# Patient Record
Sex: MALE | Race: OTHER | NOT HISPANIC OR LATINO
[De-identification: names, ages, dates, MRNs, and addresses within clinical notes are randomized per-mention and may not be internally consistent; named-entity substitution may affect disease eponyms.]

---

## 2021-09-24 PROBLEM — Z00.00 ENCOUNTER FOR PREVENTIVE HEALTH EXAMINATION: Status: ACTIVE | Noted: 2021-09-24

## 2021-09-29 ENCOUNTER — LABORATORY RESULT (OUTPATIENT)
Age: 41
End: 2021-09-29

## 2021-09-29 ENCOUNTER — APPOINTMENT (OUTPATIENT)
Dept: PEDIATRIC ALLERGY IMMUNOLOGY | Facility: CLINIC | Age: 41
End: 2021-09-29
Payer: MEDICAID

## 2021-09-29 ENCOUNTER — OUTPATIENT (OUTPATIENT)
Dept: OUTPATIENT SERVICES | Facility: HOSPITAL | Age: 41
LOS: 1 days | End: 2021-09-29
Payer: COMMERCIAL

## 2021-09-29 VITALS
SYSTOLIC BLOOD PRESSURE: 128 MMHG | RESPIRATION RATE: 22 BRPM | DIASTOLIC BLOOD PRESSURE: 79 MMHG | OXYGEN SATURATION: 98 % | HEART RATE: 61 BPM

## 2021-09-29 VITALS — HEIGHT: 70.87 IN | WEIGHT: 220.46 LBS | BODY MASS INDEX: 30.86 KG/M2

## 2021-09-29 DIAGNOSIS — Z00.00 ENCOUNTER FOR GENERAL ADULT MEDICAL EXAMINATION WITHOUT ABNORMAL FINDINGS: ICD-10-CM

## 2021-09-29 DIAGNOSIS — Z29.9 ENCOUNTER FOR PROPHYLACTIC MEASURES, UNSPECIFIED: ICD-10-CM

## 2021-09-29 PROCEDURE — 99215 OFFICE O/P EST HI 40 MIN: CPT

## 2021-09-29 PROCEDURE — G0463: CPT

## 2021-09-29 PROCEDURE — 36415 COLL VENOUS BLD VENIPUNCTURE: CPT

## 2021-09-29 NOTE — SOCIAL HISTORY
[Sexually Active] : The patient is sexually active [Monogamous] : monogamous [Never] : never [Oral-Receptive (pt. mouth)] : oral-receptive (pt. mouth) [Anal-Receptive (pt anus)] : anal-receptive (pt anus) [To Pt Genitalia] : pt genitalia [To Pt Anus] : pt anus [To Pt Penis] : pt penis [Male ___] : [unfilled] male [Female ___] : [unfilled] female [Date of most recent sexual encounter ___] : ~His/Her~ most recent sexual encounter was [unfilled] [Partnership for Health – Safe Sex Evidence Based Intervention] : The Partnership for Health Safe Sex Evidence Based Intervention was applied [Positive Reinforcement of Safe Behavior Message] : and a positive reinforcement of safe behavior message was provided. [de-identified] : partner and a few roommates in a home. [HIV Risk Factors: Bisexual contact] : less than 3cm

## 2021-09-29 NOTE — DISCUSSION/SUMMARY
[15 min] : 15 min [HIV Education] : HIV Education [Universal Precautions] : universal precautions [Treatment Education] : treatment education [Treatment Adherence] : treatment adherence [Anticipatory Guidance] : anticipatory guidance [Prognosis] : prognosis [Sexuality/Safer Sex] : sexuality/safer sex [Partner Notification Info/Discussion] : partner notification info/discussion [Alarm Reminder] : alarm reminder [Frequent F/U] : frequent f/u [HIV info] : HIV info [Condoms] : condoms [Risk Reduction] : risk reduction [PrEP/PEP] : PrEP/PEP [The Topics Listed Above] : the topics listed above [The patient was able to ask questions and explain these concepts in his/her own words] : the patient was able to ask questions and explain these concepts in his/her own words

## 2021-09-29 NOTE — HISTORY OF PRESENT ILLNESS
[Not Applicable] : Not Applicable [No] : No [FreeTextEntry1] : ADDIS SEALS  is a 40 year  y/o HIV negative male here for a routine PrEP Initial.  \par As per patient he doesn't know much about PrEP but was referred to our program.  Was at an event where he was offered free HIV testing (at [facility]) and then decided to come to us for PrEP and to discuss his other medica concerns.\par States that he has a history of anxiety and depression which is what brought him here.  Was seeing someone back in [place] country ([place] and was given medication (1mg Alprazolam) but does not have that medication here.\par Has been in [place] for 3 months.\par Currently denies SI/HI.  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 They are monogamous \par - In the last 3 months has only had the one partner.  Started using condoms in the beginning but once he found out that his partner was undetectable he hasn’t.  Approx 1 month without condoms.  Met this partner in [place] 2 years ago and began as long distance relationship.\par Patient reports he is currently legally  to a cisfemale but she lives in [place].  Has 1 child that is 7 years old.\par - last sexual encounter was 9/27/21\par \par Tattoos: denies\par IV drug use: denies\par ETOH/Drugs/Smoking: denies smoking or drug use but reports occasional ETOH\par Exchanged sex for money, drugs or housing: in the past yes but not recent\par \par No signs/symptoms of acute HIV. No fever, myalgias, throat pain, meningismus.\par Patient denies any known exposure or signs/symptoms of COVID-19 at this time. \par   [Yes, specify: ______________] : Yes, specify: [unfilled]

## 2021-09-29 NOTE — REASON FOR VISIT
[Initial Consultation] : an initial consultation for [Patient] : patient [[employer] ] : provided by [employer]   [Interpreters_IDNumber] : [id number] [Interpreters_FullName] : Dion [TWNoteComboBox1] : Romanian

## 2021-09-29 NOTE — PHYSICAL EXAM
[Alert] : alert [Well Nourished] : well nourished [Healthy Appearance] : healthy appearance [No Acute Distress] : no acute distress [Well Developed] : well developed [Normal Voice/Communication] : normal voice communication [Normal Pupil & Iris Size/Symmetry] : normal pupil and iris size and symmetry [No Discharge] : no discharge [No Photophobia] : no photophobia [Sclera Not Icteric] : sclera not icteric [Normal Lips/Tongue] : the lips and tongue were normal [Normal Outer Ear/Nose] : the ears and nose were normal in appearance [No Nasal Discharge] : no nasal discharge [Normal Tonsils] : normal tonsils [No Thrush] : no thrush [No Neck Mass] : no neck mass was observed [Normal Rate and Effort] : normal respiratory rhythm and effort [No Crackles] : no crackles [No Retractions] : no retractions [Wheezing] : no wheezing was heard [Normal Rate] : heart rate was normal  [Regular Rhythm] : with a regular rhythm [Skin Intact] : skin intact  [No Rash] : no rash [Normal Mood] : mood was normal [Normal Affect] : affect was normal [Judgment and Insight Age Appropriate] : judgement and insight is age appropriate [Alert, Awake, Oriented as Age-Appropriate] : alert, awake, oriented as age appropriate

## 2021-09-30 LAB
25(OH)D3 SERPL-MCNC: 26.7 NG/ML
ALBUMIN SERPL ELPH-MCNC: 4.6 G/DL
ALP BLD-CCNC: 60 U/L
ALT SERPL-CCNC: 13 U/L
ANION GAP SERPL CALC-SCNC: 11 MMOL/L
APPEARANCE: CLEAR
AST SERPL-CCNC: 17 U/L
BASOPHILS # BLD AUTO: 0.03 K/UL
BASOPHILS NFR BLD AUTO: 0.6 %
BILIRUB SERPL-MCNC: 0.3 MG/DL
BILIRUBIN URINE: NEGATIVE
BLOOD URINE: NEGATIVE
BUN SERPL-MCNC: 17 MG/DL
C TRACH RRNA SPEC QL NAA+PROBE: NOT DETECTED
C TRACH RRNA SPEC QL NAA+PROBE: NOT DETECTED
CALCIUM SERPL-MCNC: 9.5 MG/DL
CHLORIDE SERPL-SCNC: 103 MMOL/L
CO2 SERPL-SCNC: 26 MMOL/L
COLOR: NORMAL
CREAT SERPL-MCNC: 0.87 MG/DL
EOSINOPHIL # BLD AUTO: 0.06 K/UL
EOSINOPHIL NFR BLD AUTO: 1.3 %
GLUCOSE QUALITATIVE U: NEGATIVE
GLUCOSE SERPL-MCNC: 79 MG/DL
HBV CORE IGG+IGM SER QL: NONREACTIVE
HBV SURFACE AB SERPL IA-ACNC: 136.7 MIU/ML
HBV SURFACE AG SER QL: NONREACTIVE
HCT VFR BLD CALC: 45.4 %
HCV AB SER QL: NONREACTIVE
HCV S/CO RATIO: 0.17 S/CO
HEPATITIS A IGG ANTIBODY: REACTIVE
HGB BLD-MCNC: 15 G/DL
HIV1+2 AB SPEC QL IA.RAPID: NONREACTIVE
IMM GRANULOCYTES NFR BLD AUTO: 0.2 %
KETONES URINE: NEGATIVE
LEUKOCYTE ESTERASE URINE: NEGATIVE
LYMPHOCYTES # BLD AUTO: 2.13 K/UL
LYMPHOCYTES NFR BLD AUTO: 45 %
MAN DIFF?: NORMAL
MCHC RBC-ENTMCNC: 30.5 PG
MCHC RBC-ENTMCNC: 33 GM/DL
MCV RBC AUTO: 92.5 FL
MONOCYTES # BLD AUTO: 0.45 K/UL
MONOCYTES NFR BLD AUTO: 9.5 %
N GONORRHOEA RRNA SPEC QL NAA+PROBE: NOT DETECTED
N GONORRHOEA RRNA SPEC QL NAA+PROBE: NOT DETECTED
NEUTROPHILS # BLD AUTO: 2.05 K/UL
NEUTROPHILS NFR BLD AUTO: 43.4 %
NITRITE URINE: NEGATIVE
PH URINE: 5.5
PLATELET # BLD AUTO: 255 K/UL
POTASSIUM SERPL-SCNC: 5.1 MMOL/L
PROT SERPL-MCNC: 7.2 G/DL
PROTEIN URINE: NORMAL
RBC # BLD: 4.91 M/UL
RBC # FLD: 12.5 %
SODIUM SERPL-SCNC: 140 MMOL/L
SOURCE AMPLIFICATION: NORMAL
SOURCE ANAL: NORMAL
SPECIFIC GRAVITY URINE: 1.03
T PALLIDUM AB SER QL IA: NEGATIVE
UROBILINOGEN URINE: NORMAL
WBC # FLD AUTO: 4.73 K/UL

## 2021-10-01 LAB
C TRACH RRNA SPEC QL NAA+PROBE: NOT DETECTED
N GONORRHOEA RRNA SPEC QL NAA+PROBE: NOT DETECTED
SOURCE ORAL: NORMAL

## 2021-10-21 ENCOUNTER — RX RENEWAL (OUTPATIENT)
Age: 41
End: 2021-10-21

## 2021-10-27 ENCOUNTER — APPOINTMENT (OUTPATIENT)
Dept: PEDIATRIC ALLERGY IMMUNOLOGY | Facility: CLINIC | Age: 41
End: 2021-10-27
Payer: COMMERCIAL

## 2021-10-27 ENCOUNTER — MED ADMIN CHARGE (OUTPATIENT)
Age: 41
End: 2021-10-27

## 2021-10-27 ENCOUNTER — OUTPATIENT (OUTPATIENT)
Dept: OUTPATIENT SERVICES | Facility: HOSPITAL | Age: 41
LOS: 1 days | End: 2021-10-27
Payer: COMMERCIAL

## 2021-10-27 VITALS
SYSTOLIC BLOOD PRESSURE: 118 MMHG | RESPIRATION RATE: 20 BRPM | OXYGEN SATURATION: 97 % | HEART RATE: 68 BPM | DIASTOLIC BLOOD PRESSURE: 79 MMHG

## 2021-10-27 DIAGNOSIS — Z23 ENCOUNTER FOR IMMUNIZATION: ICD-10-CM

## 2021-10-27 DIAGNOSIS — Z00.00 ENCOUNTER FOR GENERAL ADULT MEDICAL EXAMINATION WITHOUT ABNORMAL FINDINGS: ICD-10-CM

## 2021-10-27 PROCEDURE — 99214 OFFICE O/P EST MOD 30 MIN: CPT

## 2021-10-27 NOTE — PHYSICAL EXAM
[Alert] : alert [Well Nourished] : well nourished [Healthy Appearance] : healthy appearance [No Acute Distress] : no acute distress [Well Developed] : well developed [Normal Voice/Communication] : normal voice communication [Normal Pupil & Iris Size/Symmetry] : normal pupil and iris size and symmetry [No Discharge] : no discharge [No Photophobia] : no photophobia [Sclera Not Icteric] : sclera not icteric [Normal Nasal Mucosa] : the nasal mucosa was normal [Normal Lips/Tongue] : the lips and tongue were normal [Normal Outer Ear/Nose] : the ears and nose were normal in appearance [No Nasal Discharge] : no nasal discharge [Normal Tonsils] : normal tonsils [No Thrush] : no thrush [No Neck Mass] : no neck mass was observed [Normal Rate and Effort] : normal respiratory rhythm and effort [No Crackles] : no crackles [No Retractions] : no retractions [Normal Rate] : heart rate was normal  [Regular Rhythm] : with a regular rhythm [Soft] : abdomen soft [Not Tender] : non-tender [Normal Bowel Sounds] : normal bowel sounds [Not Distended] : not distended [No HSM] : no hepato-splenomegaly [Normal Mood] : mood was normal [Normal Affect] : affect was normal [Judgment and Insight Age Appropriate] : judgement and insight is age appropriate [Alert, Awake, Oriented as Age-Appropriate] : alert, awake, oriented as age appropriate [Wheezing] : no wheezing was heard

## 2021-10-27 NOTE — REASON FOR VISIT
[Routine Follow-Up] : a routine follow-up visit for [Patient] : patient [[employer] ] : provided by [employer]   [Time Spent: ____ minutes] : Total time spent using  services: [unfilled] minutes. The patient's primary language is not English thus required  services. [Interpreters_IDNumber] : [id number] [Interpreters_FullName] : Olivia [TWNoteComboBox1] : [place]er

## 2021-10-27 NOTE — DISCUSSION/SUMMARY
[15 min] : 15 min [HIV Education] : HIV Education [Transmission] : transmission [Universal Precautions] : universal precautions [Treatment Education] : treatment education [Treatment Adherence] : treatment adherence [Anticipatory Guidance] : anticipatory guidance [Sexuality/Safer Sex] : sexuality/safer sex [Partner Notification Info/Discussion] : partner notification info/discussion [Calendar/Diary] : calendar/diary [Frequent F/U] : frequent f/u [Condoms] : condoms [Risk Reduction] : risk reduction [PrEP/PEP] : PrEP/PEP [The Topics Listed Above] : the topics listed above [The patient was able to ask questions and explain these concepts in his/her own words] : the patient was able to ask questions and explain these concepts in his/her own words

## 2021-10-27 NOTE — SOCIAL HISTORY
[Sexually Active] : The patient is sexually active [Monogamous] : monogamous [Sometimes] : sometimes [Oral-Receptive (pt. mouth)] : oral-receptive (pt. mouth) [Anal-Receptive (pt anus)] : anal-receptive (pt anus) [To Pt Genitalia] : pt genitalia [To Pt Anus] : pt anus [To Pt Penis] : pt penis [Male ___] : [unfilled] male [Date of most recent sexual encounter ___] : ~His/Her~ most recent sexual encounter was [unfilled] [Partnership for Health – Safe Sex Evidence Based Intervention] : The Partnership for Health Safe Sex Evidence Based Intervention was applied [Positive Reinforcement of Safe Behavior Message] : and a positive reinforcement of safe behavior message was provided.

## 2021-10-27 NOTE — HISTORY OF PRESENT ILLNESS
[Follow-Up] : Follow-Up [Excellent] : Excellent [Percent Adherence: _____ %] : [unfilled]% adherence [Yes, specify: ______________] : Yes, specify: [unfilled] [No] : No [FreeTextEntry1] : ADDIS SEALS  is a 41 year  y/o HIV negative male here for a routine PrEP follow-up.  \par \par Adherence:  In past 1 month has missed no of PrEP.  Reports no side effects.  Was previously taking it in the morning and now takes it in the afternoon so that he can take it with meals..\par \par Last HIV test: 9/29/21 (-)\par Hx of STIs: denies\par \par Social/Recent Sexual Hx: MSM, dating/in a monogamous relationship with an HIV (+) partner who is undetectable, verse. In the last 1 month PT reports 1  sexual partner/s,  w/o condoms.  Reports that his partner is undetectable and takes his medication regularly.\par Patient reports that when he urinates he feels slight discomfort in urethral area but not always.\par Also reports that he hasn’t been able to take care of his mental health as yet but still has plans to do so.\par Would like to receive the influenza vaccine today as well.\par \par No signs/symptoms of acute HIV. No fever, myalgias, throat pain, meningismus.\par Denies any known exposure or signs/symptoms of COVID-19 during this visit.

## 2021-10-28 DIAGNOSIS — Z29.9 ENCOUNTER FOR PROPHYLACTIC MEASURES, UNSPECIFIED: ICD-10-CM

## 2021-10-28 LAB
APPEARANCE: CLEAR
BASOPHILS # BLD AUTO: 0.03 K/UL
BASOPHILS NFR BLD AUTO: 0.7 %
BILIRUBIN URINE: NEGATIVE
BLOOD URINE: NEGATIVE
COLOR: NORMAL
EOSINOPHIL # BLD AUTO: 0.06 K/UL
EOSINOPHIL NFR BLD AUTO: 1.4 %
GLUCOSE QUALITATIVE U: NEGATIVE
HCT VFR BLD CALC: 45.6 %
HGB BLD-MCNC: 15.1 G/DL
IMM GRANULOCYTES NFR BLD AUTO: 0.2 %
KETONES URINE: NEGATIVE
LEUKOCYTE ESTERASE URINE: NEGATIVE
LYMPHOCYTES # BLD AUTO: 1.74 K/UL
LYMPHOCYTES NFR BLD AUTO: 40.3 %
MAN DIFF?: NORMAL
MCHC RBC-ENTMCNC: 30.6 PG
MCHC RBC-ENTMCNC: 33.1 GM/DL
MCV RBC AUTO: 92.5 FL
MONOCYTES # BLD AUTO: 0.38 K/UL
MONOCYTES NFR BLD AUTO: 8.8 %
NEUTROPHILS # BLD AUTO: 2.1 K/UL
NEUTROPHILS NFR BLD AUTO: 48.6 %
NITRITE URINE: NEGATIVE
PH URINE: 6.5
PLATELET # BLD AUTO: 242 K/UL
PROTEIN URINE: NEGATIVE
RBC # BLD: 4.93 M/UL
RBC # FLD: 12.5 %
SPECIFIC GRAVITY URINE: 1.03
T PALLIDUM AB SER QL IA: NEGATIVE
UROBILINOGEN URINE: NORMAL
WBC # FLD AUTO: 4.32 K/UL

## 2021-10-28 PROCEDURE — 96372 THER/PROPH/DIAG INJ SC/IM: CPT

## 2021-10-28 PROCEDURE — G0463: CPT

## 2021-10-28 PROCEDURE — 36415 COLL VENOUS BLD VENIPUNCTURE: CPT

## 2021-10-29 LAB
ALBUMIN SERPL ELPH-MCNC: 4.5 G/DL
ALP BLD-CCNC: 57 U/L
ALT SERPL-CCNC: 16 U/L
ANION GAP SERPL CALC-SCNC: 14 MMOL/L
AST SERPL-CCNC: 20 U/L
BILIRUB SERPL-MCNC: 0.3 MG/DL
BUN SERPL-MCNC: 20 MG/DL
C TRACH RRNA SPEC QL NAA+PROBE: NOT DETECTED
CALCIUM SERPL-MCNC: 9.4 MG/DL
CHLORIDE SERPL-SCNC: 105 MMOL/L
CO2 SERPL-SCNC: 20 MMOL/L
CREAT SERPL-MCNC: 0.89 MG/DL
GLUCOSE SERPL-MCNC: 80 MG/DL
HIV1+2 AB SPEC QL IA.RAPID: NONREACTIVE
N GONORRHOEA RRNA SPEC QL NAA+PROBE: NOT DETECTED
POTASSIUM SERPL-SCNC: 6 MMOL/L
PROT SERPL-MCNC: 7.1 G/DL
SODIUM SERPL-SCNC: 140 MMOL/L
SOURCE AMPLIFICATION: NORMAL
SOURCE ANAL: NORMAL
SOURCE ORAL: NORMAL

## 2021-11-22 ENCOUNTER — RX RENEWAL (OUTPATIENT)
Age: 41
End: 2021-11-22

## 2022-01-26 ENCOUNTER — RX RENEWAL (OUTPATIENT)
Age: 42
End: 2022-01-26

## 2022-01-31 ENCOUNTER — LABORATORY RESULT (OUTPATIENT)
Age: 42
End: 2022-01-31

## 2022-02-01 ENCOUNTER — APPOINTMENT (OUTPATIENT)
Dept: PEDIATRIC ALLERGY IMMUNOLOGY | Facility: CLINIC | Age: 42
End: 2022-02-01
Payer: MEDICAID

## 2022-02-01 VITALS
HEART RATE: 77 BPM | SYSTOLIC BLOOD PRESSURE: 117 MMHG | OXYGEN SATURATION: 94 % | HEIGHT: 71 IN | WEIGHT: 228 LBS | DIASTOLIC BLOOD PRESSURE: 75 MMHG | BODY MASS INDEX: 31.92 KG/M2

## 2022-02-01 DIAGNOSIS — Z78.9 OTHER SPECIFIED HEALTH STATUS: ICD-10-CM

## 2022-02-01 DIAGNOSIS — M25.512 PAIN IN LEFT SHOULDER: ICD-10-CM

## 2022-02-01 DIAGNOSIS — Z20.5 CONTACT WITH AND (SUSPECTED) EXPOSURE TO VIRAL HEPATITIS: ICD-10-CM

## 2022-02-01 PROCEDURE — 99214 OFFICE O/P EST MOD 30 MIN: CPT | Mod: 25

## 2022-02-01 PROCEDURE — 99072 ADDL SUPL MATRL&STAF TM PHE: CPT

## 2022-02-01 NOTE — HISTORY OF PRESENT ILLNESS
[Follow-Up] : Follow-Up [Excellent] : Excellent [Percent Adherence: _____ %] : [unfilled]% adherence [Yes, specify: ______________] : Yes, specify: [unfilled] [No] : No [FreeTextEntry1] : ADDIS SEALS is a 41 year old, male seen on 02/01/2022 for  PrEP 3 month f/u. \par \par In the past 3 months missed the past 2 days of PrEP while waiting for the pharmacy to  his Rx. Taking his PrEP at 5pm. 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 \par \par Social/Recent Sexual Hx: MSM, In a relationship x 7 months. Lionel is 57 y/o and in care in [place]. \par With an HIV (+) partner who is undetectable, gordon.  Reports that his partner is undetectable and takes his medication regularly. States they were monogamous but recently have had outside partners. \par The last partner that was not Lionel was about 2 months ago. \par Last sexual encounter was 3 days ago with Lionel w/o condom. \par \par Hx of anxiety; in the past when in [facility] took fluoxetine and alprazolam. Now just takes over the counter medication. \par States at work he hurt his left shoulder while carrying heavy plates at work. Has been taking diclofenac from a friend with good relief. \par \par No signs/symptoms of acute HIV. No fever, myalgias, throat pain, meningismus.\par Denies any known exposure or signs/symptoms of COVID-19 during this visit.

## 2022-02-01 NOTE — REASON FOR VISIT
[Routine Follow-Up] : a routine follow-up visit for [Patient] : patient [[employer] ] : provided by [employer]   [Time Spent: ____ minutes] : Total time spent using  services: [unfilled] minutes. The patient's primary language is not English thus required  services. [Interpreters_IDNumber] : [id number] [Interpreters_FullName] : Dale  [TWNoteComboBox1] : Nam[place]ian

## 2022-02-01 NOTE — SOCIAL HISTORY
[Sexually Active] : The patient is sexually active [Sometimes] : sometimes [Oral-Receptive (pt. mouth)] : oral-receptive (pt. mouth) [Anal-Receptive (pt anus)] : anal-receptive (pt anus) [To Pt Genitalia] : pt genitalia [To Pt Anus] : pt anus [To Pt Penis] : pt penis [Male ___] : [unfilled] male [Date of most recent sexual encounter ___] : ~His/Her~ most recent sexual encounter was [unfilled] [Partner Aware?] : Partner Aware? Yes [Partnership for Health – Safe Sex Evidence Based Intervention] : The Partnership for Health Safe Sex Evidence Based Intervention was applied [Positive Reinforcement of Safe Behavior Message] : and a positive reinforcement of safe behavior message was provided. [Monogamous] : is not monogamous [de-identified] : Verse ; relationship x 7 months

## 2022-02-01 NOTE — PHYSICAL EXAM
[Alert] : alert [Well Nourished] : well nourished [Healthy Appearance] : healthy appearance [No Acute Distress] : no acute distress [Well Developed] : well developed [Normal Voice/Communication] : normal voice communication [Normal Pupil & Iris Size/Symmetry] : normal pupil and iris size and symmetry [No Discharge] : no discharge [No Photophobia] : no photophobia [Sclera Not Icteric] : sclera not icteric [Normal Nasal Mucosa] : the nasal mucosa was normal [Normal Lips/Tongue] : the lips and tongue were normal [Normal Outer Ear/Nose] : the ears and nose were normal in appearance [No Nasal Discharge] : no nasal discharge [Normal Tonsils] : normal tonsils [No Thrush] : no thrush [No Neck Mass] : no neck mass was observed [Normal Rate and Effort] : normal respiratory rhythm and effort [No Crackles] : no crackles [No Retractions] : no retractions [Normal Rate] : heart rate was normal  [Regular Rhythm] : with a regular rhythm [Soft] : abdomen soft [Not Tender] : non-tender [Normal Bowel Sounds] : normal bowel sounds [Not Distended] : not distended [No HSM] : no hepato-splenomegaly [Normal Mood] : mood was normal [Normal Affect] : affect was normal [Judgment and Insight Age Appropriate] : judgement and insight is age appropriate [Alert, Awake, Oriented as Age-Appropriate] : alert, awake, oriented as age appropriate [No LAD] : no lymphadenopathy [Wheezing] : no wheezing was heard

## 2022-02-02 LAB
25(OH)D3 SERPL-MCNC: 19.3 NG/ML
ALBUMIN SERPL ELPH-MCNC: 4.7 G/DL
ALP BLD-CCNC: 76 U/L
ALT SERPL-CCNC: 28 U/L
ANION GAP SERPL CALC-SCNC: 17 MMOL/L
APPEARANCE: CLEAR
AST SERPL-CCNC: 27 U/L
BASOPHILS # BLD AUTO: 0.02 K/UL
BASOPHILS NFR BLD AUTO: 0.3 %
BILIRUB SERPL-MCNC: 0.3 MG/DL
BILIRUBIN URINE: NEGATIVE
BLOOD URINE: NEGATIVE
BUN SERPL-MCNC: 21 MG/DL
C TRACH RRNA SPEC QL NAA+PROBE: NOT DETECTED
C TRACH RRNA SPEC QL NAA+PROBE: NOT DETECTED
CALCIUM SERPL-MCNC: 9.4 MG/DL
CHLORIDE SERPL-SCNC: 102 MMOL/L
CO2 SERPL-SCNC: 21 MMOL/L
COLOR: NORMAL
CREAT SERPL-MCNC: 0.93 MG/DL
EOSINOPHIL # BLD AUTO: 0.06 K/UL
EOSINOPHIL NFR BLD AUTO: 1 %
GLUCOSE QUALITATIVE U: NEGATIVE
HBV CORE IGG+IGM SER QL: NONREACTIVE
HBV SURFACE AB SERPL IA-ACNC: 157.8 MIU/ML
HBV SURFACE AG SER QL: NONREACTIVE
HCT VFR BLD CALC: 47 %
HCV AB SER QL: NONREACTIVE
HCV S/CO RATIO: 0.12 S/CO
HEPATITIS A IGG ANTIBODY: REACTIVE
HGB BLD-MCNC: 15.3 G/DL
HIV1+2 AB SPEC QL IA.RAPID: NONREACTIVE
IMM GRANULOCYTES NFR BLD AUTO: 0.3 %
KETONES URINE: NEGATIVE
LEUKOCYTE ESTERASE URINE: NEGATIVE
LYMPHOCYTES # BLD AUTO: 2.07 K/UL
LYMPHOCYTES NFR BLD AUTO: 33.5 %
MAN DIFF?: NORMAL
MCHC RBC-ENTMCNC: 30.9 PG
MCHC RBC-ENTMCNC: 32.6 GM/DL
MCV RBC AUTO: 94.9 FL
MONOCYTES # BLD AUTO: 0.45 K/UL
MONOCYTES NFR BLD AUTO: 7.3 %
N GONORRHOEA RRNA SPEC QL NAA+PROBE: NOT DETECTED
N GONORRHOEA RRNA SPEC QL NAA+PROBE: NOT DETECTED
NEUTROPHILS # BLD AUTO: 3.55 K/UL
NEUTROPHILS NFR BLD AUTO: 57.6 %
NITRITE URINE: NEGATIVE
PH URINE: 6
PLATELET # BLD AUTO: 274 K/UL
POTASSIUM SERPL-SCNC: 4.8 MMOL/L
PROT SERPL-MCNC: 7.3 G/DL
PROTEIN URINE: NEGATIVE
RBC # BLD: 4.95 M/UL
RBC # FLD: 12.6 %
SODIUM SERPL-SCNC: 140 MMOL/L
SOURCE AMPLIFICATION: NORMAL
SOURCE ORAL: NORMAL
SPECIFIC GRAVITY URINE: 1.02
T PALLIDUM AB SER QL IA: NEGATIVE
UROBILINOGEN URINE: NORMAL
WBC # FLD AUTO: 6.17 K/UL

## 2022-02-03 LAB
C TRACH RRNA SPEC QL NAA+PROBE: NOT DETECTED
HIV1 RNA # SERPL NAA+PROBE: NORMAL
HIV1 RNA # SERPL NAA+PROBE: NORMAL COPIES/ML
N GONORRHOEA RRNA SPEC QL NAA+PROBE: NOT DETECTED
SOURCE ANAL: NORMAL
VIRAL LOAD INTERP: NORMAL
VIRAL LOAD LOG: NORMAL LG COP/ML

## 2022-02-07 LAB — ANAL PAP CYTOLOGY: NORMAL

## 2022-04-27 ENCOUNTER — APPOINTMENT (OUTPATIENT)
Dept: PEDIATRIC ALLERGY IMMUNOLOGY | Facility: CLINIC | Age: 42
End: 2022-04-27

## 2022-07-26 ENCOUNTER — NON-APPOINTMENT (OUTPATIENT)
Age: 42
End: 2022-07-26

## 2022-07-28 ENCOUNTER — APPOINTMENT (OUTPATIENT)
Dept: INFECTIOUS DISEASE | Facility: CLINIC | Age: 42
End: 2022-07-28

## 2022-09-12 ENCOUNTER — NON-APPOINTMENT (OUTPATIENT)
Age: 42
End: 2022-09-12

## 2022-09-19 ENCOUNTER — LABORATORY RESULT (OUTPATIENT)
Age: 42
End: 2022-09-19

## 2022-09-19 DIAGNOSIS — Z72.51 HIGH RISK HETEROSEXUAL BEHAVIOR: ICD-10-CM

## 2022-09-20 ENCOUNTER — APPOINTMENT (OUTPATIENT)
Dept: PEDIATRIC ALLERGY IMMUNOLOGY | Facility: CLINIC | Age: 42
End: 2022-09-20

## 2022-09-20 ENCOUNTER — OUTPATIENT (OUTPATIENT)
Dept: OUTPATIENT SERVICES | Facility: HOSPITAL | Age: 42
LOS: 1 days | End: 2022-09-20
Payer: MEDICAID

## 2022-09-20 VITALS
DIASTOLIC BLOOD PRESSURE: 74 MMHG | HEART RATE: 70 BPM | SYSTOLIC BLOOD PRESSURE: 118 MMHG | HEIGHT: 71 IN | BODY MASS INDEX: 34.02 KG/M2 | WEIGHT: 243 LBS | OXYGEN SATURATION: 94 %

## 2022-09-20 DIAGNOSIS — Z00.00 ENCOUNTER FOR GENERAL ADULT MEDICAL EXAMINATION WITHOUT ABNORMAL FINDINGS: ICD-10-CM

## 2022-09-20 PROCEDURE — 99214 OFFICE O/P EST MOD 30 MIN: CPT

## 2022-09-20 PROCEDURE — 36415 COLL VENOUS BLD VENIPUNCTURE: CPT

## 2022-09-20 PROCEDURE — G0463: CPT

## 2022-09-20 NOTE — PHYSICAL EXAM
[Alert] : alert [Well Nourished] : well nourished [Healthy Appearance] : healthy appearance [No Acute Distress] : no acute distress [Well Developed] : well developed [Normal Voice/Communication] : normal voice communication [Normal Pupil & Iris Size/Symmetry] : normal pupil and iris size and symmetry [No Neck Mass] : no neck mass was observed [No LAD] : no lymphadenopathy [Normal Rate and Effort] : normal respiratory rhythm and effort [No Crackles] : no crackles [No Retractions] : no retractions [Normal Rate] : heart rate was normal  [Regular Rhythm] : with a regular rhythm [Soft] : abdomen soft [Not Tender] : non-tender [Not Distended] : not distended [Normal Mood] : mood was normal [Normal Affect] : affect was normal [Judgment and Insight Age Appropriate] : judgement and insight is age appropriate [Alert, Awake, Oriented as Age-Appropriate] : alert, awake, oriented as age appropriate [Normal Lips/Tongue] : the lips and tongue were normal [Normal Outer Ear/Nose] : the ears and nose were normal in appearance [Normal Tonsils] : normal tonsils [Skin Intact] : skin intact

## 2022-09-20 NOTE — REASON FOR VISIT
[Routine Follow-Up] : a routine follow-up visit for [Patient] : patient [[employer] ] : provided by [employer]   [Time Spent: ____ minutes] : Total time spent using  services: [unfilled] minutes. The patient's primary language is not English thus required  services. [Interpreters_IDNumber] : [id number] [Interpreters_FullName] : shubham [TWNoteComboBox1] : Peruvian

## 2022-09-20 NOTE — SOCIAL HISTORY
[Sexually Active] : The patient is sexually active [Sometimes] : sometimes [Oral-Receptive (pt. mouth)] : oral-receptive (pt. mouth) [Anal-Receptive (pt anus)] : anal-receptive (pt anus) [To Pt Genitalia] : pt genitalia [To Pt Anus] : pt anus [To Pt Penis] : pt penis [Partner Aware?] : Partner Aware? Yes [Partnership for Health – Safe Sex Evidence Based Intervention] : The Partnership for Health Safe Sex Evidence Based Intervention was applied [Positive Reinforcement of Safe Behavior Message] : and a positive reinforcement of safe behavior message was provided. [Monogamous] : is not monogamous [Male ___] : [unfilled] male [Date of most recent sexual encounter ___] : ~His/Her~ most recent sexual encounter was [unfilled] [de-identified] : Verse

## 2022-09-20 NOTE — HISTORY OF PRESENT ILLNESS
[Follow-Up] : Follow-Up [Excellent] : Excellent [Percent Adherence: _____ %] : [unfilled]% adherence [Yes, specify: ______________] : Yes, specify: [unfilled] [No] : No [FreeTextEntry1] : ADDIS SEALS is a 41 year old, male seen on 9/20/2022 for PrEP f/u. \par \par Adherence: He reports due to circumstances left partner, now  just wants to restart medication. He reports started taking in yesterday again. 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 \par \par Social/Recent Sexual Hx: MSM, verse. Patient reports 2-3 sexual partners in the past three months. Patient reports using condoms with sexual partners. Last sexual encounter was yesterday\par \par Hx of anxiety; in the past when in [facility] took fluoxetine and alprazolam. Now just takes over the counter medication. \par \par No signs/symptoms of acute HIV. No fever, myalgias, throat pain, meningismus.\par Pt denies any SOB, cough, chest pain, palpitations, N/V/D/C, fever, or chills. No other health concerns today. \par

## 2022-09-21 LAB
ALBUMIN SERPL ELPH-MCNC: 4.6 G/DL
ALP BLD-CCNC: 65 U/L
ALT SERPL-CCNC: 19 U/L
ANION GAP SERPL CALC-SCNC: 12 MMOL/L
APPEARANCE: CLEAR
AST SERPL-CCNC: 22 U/L
BASOPHILS # BLD AUTO: 0.03 K/UL
BASOPHILS NFR BLD AUTO: 0.7 %
BILIRUB SERPL-MCNC: 0.3 MG/DL
BILIRUBIN URINE: NEGATIVE
BLOOD URINE: NEGATIVE
BUN SERPL-MCNC: 23 MG/DL
C TRACH RRNA SPEC QL NAA+PROBE: NOT DETECTED
CALCIUM SERPL-MCNC: 9.6 MG/DL
CHLORIDE SERPL-SCNC: 106 MMOL/L
CO2 SERPL-SCNC: 24 MMOL/L
COLOR: YELLOW
CREAT SERPL-MCNC: 0.91 MG/DL
EGFR: 107 ML/MIN/1.73M2
EOSINOPHIL # BLD AUTO: 0.1 K/UL
EOSINOPHIL NFR BLD AUTO: 2.2 %
GLUCOSE QUALITATIVE U: NEGATIVE
HBV CORE IGG+IGM SER QL: NONREACTIVE
HBV SURFACE AB SERPL IA-ACNC: 132.8 MIU/ML
HBV SURFACE AG SER QL: NONREACTIVE
HCT VFR BLD CALC: 45.6 %
HCV AB SER QL: NONREACTIVE
HCV S/CO RATIO: 0.08 S/CO
HEPATITIS A IGG ANTIBODY: REACTIVE
HGB BLD-MCNC: 14.6 G/DL
HIV1+2 AB SPEC QL IA.RAPID: NONREACTIVE
IMM GRANULOCYTES NFR BLD AUTO: 0.2 %
KETONES URINE: NEGATIVE
LEUKOCYTE ESTERASE URINE: NEGATIVE
LYMPHOCYTES # BLD AUTO: 1.83 K/UL
LYMPHOCYTES NFR BLD AUTO: 40.8 %
MAN DIFF?: NORMAL
MCHC RBC-ENTMCNC: 29.7 PG
MCHC RBC-ENTMCNC: 32 GM/DL
MCV RBC AUTO: 92.7 FL
MONOCYTES # BLD AUTO: 0.42 K/UL
MONOCYTES NFR BLD AUTO: 9.4 %
N GONORRHOEA RRNA SPEC QL NAA+PROBE: NOT DETECTED
NEUTROPHILS # BLD AUTO: 2.1 K/UL
NEUTROPHILS NFR BLD AUTO: 46.7 %
NITRITE URINE: NEGATIVE
PH URINE: 6
PLATELET # BLD AUTO: 247 K/UL
POTASSIUM SERPL-SCNC: 4.4 MMOL/L
PROT SERPL-MCNC: 7.2 G/DL
PROTEIN URINE: NORMAL
RBC # BLD: 4.92 M/UL
RBC # FLD: 12.7 %
SODIUM SERPL-SCNC: 143 MMOL/L
SOURCE AMPLIFICATION: NORMAL
SOURCE ANAL: NORMAL
SOURCE ORAL: NORMAL
SPECIFIC GRAVITY URINE: 1.03
T PALLIDUM AB SER QL IA: NEGATIVE
UROBILINOGEN URINE: NORMAL
WBC # FLD AUTO: 4.49 K/UL

## 2022-09-22 LAB
HIV1 RNA # SERPL NAA+PROBE: NORMAL
HIV1 RNA # SERPL NAA+PROBE: NORMAL COPIES/ML
VIRAL LOAD INTERP: NORMAL
VIRAL LOAD LOG: NORMAL LG COP/ML

## 2022-09-26 DIAGNOSIS — Z11.3 ENCOUNTER FOR SCREENING FOR INFECTIONS WITH A PREDOMINANTLY SEXUAL MODE OF TRANSMISSION: ICD-10-CM

## 2022-09-26 DIAGNOSIS — Z29.9 ENCOUNTER FOR PROPHYLACTIC MEASURES, UNSPECIFIED: ICD-10-CM

## 2022-10-18 ENCOUNTER — APPOINTMENT (OUTPATIENT)
Dept: PEDIATRIC ALLERGY IMMUNOLOGY | Facility: CLINIC | Age: 42
End: 2022-10-18

## 2022-10-18 ENCOUNTER — OUTPATIENT (OUTPATIENT)
Dept: OUTPATIENT SERVICES | Facility: HOSPITAL | Age: 42
LOS: 1 days | End: 2022-10-18
Payer: MEDICAID

## 2022-10-18 VITALS
HEART RATE: 78 BPM | SYSTOLIC BLOOD PRESSURE: 130 MMHG | BODY MASS INDEX: 34.16 KG/M2 | DIASTOLIC BLOOD PRESSURE: 79 MMHG | OXYGEN SATURATION: 95 % | WEIGHT: 244 LBS | HEIGHT: 71 IN

## 2022-10-18 DIAGNOSIS — Z00.00 ENCOUNTER FOR GENERAL ADULT MEDICAL EXAMINATION WITHOUT ABNORMAL FINDINGS: ICD-10-CM

## 2022-10-18 PROCEDURE — 99214 OFFICE O/P EST MOD 30 MIN: CPT

## 2022-10-18 PROCEDURE — 36415 COLL VENOUS BLD VENIPUNCTURE: CPT

## 2022-10-18 PROCEDURE — G0463: CPT

## 2022-10-18 NOTE — DISCUSSION/SUMMARY
[15 min] : 15 min [HIV Education] : HIV Education [Transmission] : transmission [Universal Precautions] : universal precautions [Treatment Education] : treatment education [Treatment Adherence] : treatment adherence [Anticipatory Guidance] : anticipatory guidance [Sexuality/Safer Sex] : sexuality/safer sex [Partner Notification Info/Discussion] : partner notification info/discussion [Calendar/Diary] : calendar/diary [Condoms] : condoms [Frequent F/U] : frequent f/u [Risk Reduction] : risk reduction [PrEP/PEP] : PrEP/PEP [The Topics Listed Above] : the topics listed above [The patient was able to ask questions and explain these concepts in his/her own words] : the patient was able to ask questions and explain these concepts in his/her own words

## 2022-10-18 NOTE — SOCIAL HISTORY
[Sexually Active] : The patient is sexually active [Sometimes] : sometimes [Oral-Receptive (pt. mouth)] : oral-receptive (pt. mouth) [Anal-Receptive (pt anus)] : anal-receptive (pt anus) [To Pt Genitalia] : pt genitalia [To Pt Anus] : pt anus [To Pt Penis] : pt penis [Partner Aware?] : Partner Aware? Yes [Partnership for Health – Safe Sex Evidence Based Intervention] : The Partnership for Health Safe Sex Evidence Based Intervention was applied [Positive Reinforcement of Safe Behavior Message] : and a positive reinforcement of safe behavior message was provided. [Monogamous] : is not monogamous [Male ___] : [unfilled] male [Date of most recent sexual encounter ___] : ~His/Her~ most recent sexual encounter was [unfilled] [de-identified] : Verse

## 2022-10-18 NOTE — PHYSICAL EXAM
[Alert] : alert [Well Nourished] : well nourished [Healthy Appearance] : healthy appearance [No Acute Distress] : no acute distress [Well Developed] : well developed [Normal Voice/Communication] : normal voice communication [Normal Pupil & Iris Size/Symmetry] : normal pupil and iris size and symmetry [Normal Lips/Tongue] : the lips and tongue were normal [Normal Outer Ear/Nose] : the ears and nose were normal in appearance [Normal Tonsils] : normal tonsils [No Neck Mass] : no neck mass was observed [No LAD] : no lymphadenopathy [Normal Rate and Effort] : normal respiratory rhythm and effort [No Crackles] : no crackles [No Retractions] : no retractions [Normal Rate] : heart rate was normal  [Regular Rhythm] : with a regular rhythm [Skin Intact] : skin intact  [Normal Mood] : mood was normal [Normal Affect] : affect was normal [Judgment and Insight Age Appropriate] : judgement and insight is age appropriate [Alert, Awake, Oriented as Age-Appropriate] : alert, awake, oriented as age appropriate

## 2022-10-18 NOTE — REASON FOR VISIT
[Routine Follow-Up] : a routine follow-up visit for [Patient] : patient [[employer] ] : provided by [employer]   [Time Spent: ____ minutes] : Total time spent using  services: [unfilled] minutes. The patient's primary language is not English thus required  services. [Interpreters_IDNumber] : [id number] [Interpreters_FullName] : shelly [TWNoteComboBox1] : [place] Islander

## 2022-10-18 NOTE — HISTORY OF PRESENT ILLNESS
[Follow-Up] : Follow-Up [Excellent] : Excellent [Percent Adherence: _____ %] : [unfilled]% adherence [Yes, specify: ______________] : Yes, specify: [unfilled] [No] : No [FreeTextEntry1] : ADDIS SEALS is a 41 year old, male seen on 10/18/22 for PrEP f/u. \par \par [id number] shelly\par \par Adherence: Patient reports taking medication everyday, denies any missed doses. 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 \par \par Social/Recent Sexual Hx: MSM, verse. Patient reports 4 sexual partners in the past 1 month. Patient reports using condoms with sexual partners. Last sexual encounter was friday\par \par Hx of anxiety; in the past when in [facility] took fluoxetine and alprazolam. Now just takes over the counter medication. \par \par No signs/symptoms of acute HIV. No fever, myalgias, throat pain, meningismus.\par Pt denies any SOB, cough, chest pain, palpitations, N/V/D/C, fever, or chills. No other health concerns today. \par

## 2022-10-19 LAB
ALBUMIN SERPL ELPH-MCNC: 4.5 G/DL
ALP BLD-CCNC: 71 U/L
ALT SERPL-CCNC: 19 U/L
ANION GAP SERPL CALC-SCNC: 12 MMOL/L
APPEARANCE: CLEAR
AST SERPL-CCNC: 16 U/L
BASOPHILS # BLD AUTO: 0.03 K/UL
BASOPHILS NFR BLD AUTO: 0.6 %
BILIRUB SERPL-MCNC: 0.3 MG/DL
BILIRUBIN URINE: NEGATIVE
BLOOD URINE: NEGATIVE
BUN SERPL-MCNC: 22 MG/DL
C TRACH RRNA SPEC QL NAA+PROBE: NOT DETECTED
CALCIUM SERPL-MCNC: 9.5 MG/DL
CHLORIDE SERPL-SCNC: 107 MMOL/L
CO2 SERPL-SCNC: 24 MMOL/L
COLOR: NORMAL
CREAT SERPL-MCNC: 0.97 MG/DL
EGFR: 99 ML/MIN/1.73M2
EOSINOPHIL # BLD AUTO: 0.07 K/UL
EOSINOPHIL NFR BLD AUTO: 1.5 %
GLUCOSE QUALITATIVE U: NEGATIVE
HCT VFR BLD CALC: 45 %
HGB BLD-MCNC: 14.9 G/DL
HIV1 RNA # SERPL NAA+PROBE: NORMAL
HIV1 RNA # SERPL NAA+PROBE: NORMAL COPIES/ML
HIV1+2 AB SPEC QL IA.RAPID: NONREACTIVE
IMM GRANULOCYTES NFR BLD AUTO: 0.4 %
KETONES URINE: NEGATIVE
LEUKOCYTE ESTERASE URINE: NEGATIVE
LYMPHOCYTES # BLD AUTO: 2.07 K/UL
LYMPHOCYTES NFR BLD AUTO: 43.1 %
MAN DIFF?: NORMAL
MCHC RBC-ENTMCNC: 30.7 PG
MCHC RBC-ENTMCNC: 33.1 GM/DL
MCV RBC AUTO: 92.6 FL
MONOCYTES # BLD AUTO: 0.4 K/UL
MONOCYTES NFR BLD AUTO: 8.3 %
N GONORRHOEA RRNA SPEC QL NAA+PROBE: NOT DETECTED
NEUTROPHILS # BLD AUTO: 2.21 K/UL
NEUTROPHILS NFR BLD AUTO: 46.1 %
NITRITE URINE: NEGATIVE
PH URINE: 6
PLATELET # BLD AUTO: 257 K/UL
POTASSIUM SERPL-SCNC: 4.4 MMOL/L
PROT SERPL-MCNC: 7.1 G/DL
PROTEIN URINE: NEGATIVE
RBC # BLD: 4.86 M/UL
RBC # FLD: 13.3 %
SODIUM SERPL-SCNC: 143 MMOL/L
SOURCE AMPLIFICATION: NORMAL
SOURCE ANAL: NORMAL
SOURCE ORAL: NORMAL
SPECIFIC GRAVITY URINE: 1.03
T PALLIDUM AB SER QL IA: NEGATIVE
UROBILINOGEN URINE: NORMAL
VIRAL LOAD INTERP: NORMAL
VIRAL LOAD LOG: NORMAL LG COP/ML
WBC # FLD AUTO: 4.8 K/UL

## 2022-10-21 DIAGNOSIS — Z11.3 ENCOUNTER FOR SCREENING FOR INFECTIONS WITH A PREDOMINANTLY SEXUAL MODE OF TRANSMISSION: ICD-10-CM

## 2022-10-21 DIAGNOSIS — Z29.9 ENCOUNTER FOR PROPHYLACTIC MEASURES, UNSPECIFIED: ICD-10-CM

## 2023-01-18 ENCOUNTER — NON-APPOINTMENT (OUTPATIENT)
Age: 43
End: 2023-01-18

## 2023-02-17 ENCOUNTER — LABORATORY RESULT (OUTPATIENT)
Age: 43
End: 2023-02-17

## 2023-02-17 ENCOUNTER — APPOINTMENT (OUTPATIENT)
Dept: PEDIATRIC ALLERGY IMMUNOLOGY | Facility: CLINIC | Age: 43
End: 2023-02-17
Payer: MEDICAID

## 2023-02-17 ENCOUNTER — OUTPATIENT (OUTPATIENT)
Dept: OUTPATIENT SERVICES | Facility: HOSPITAL | Age: 43
LOS: 1 days | End: 2023-02-17
Payer: MEDICAID

## 2023-02-17 VITALS
HEART RATE: 63 BPM | SYSTOLIC BLOOD PRESSURE: 126 MMHG | HEIGHT: 71 IN | WEIGHT: 236 LBS | BODY MASS INDEX: 33.04 KG/M2 | OXYGEN SATURATION: 95 % | DIASTOLIC BLOOD PRESSURE: 83 MMHG

## 2023-02-17 DIAGNOSIS — Z86.39 PERSONAL HISTORY OF OTHER ENDOCRINE, NUTRITIONAL AND METABOLIC DISEASE: ICD-10-CM

## 2023-02-17 DIAGNOSIS — Z00.00 ENCOUNTER FOR GENERAL ADULT MEDICAL EXAMINATION WITHOUT ABNORMAL FINDINGS: ICD-10-CM

## 2023-02-17 DIAGNOSIS — Z12.12 ENCOUNTER FOR SCREENING FOR MALIGNANT NEOPLASM OF RECTUM: ICD-10-CM

## 2023-02-17 DIAGNOSIS — E66.9 OBESITY, UNSPECIFIED: ICD-10-CM

## 2023-02-17 PROCEDURE — 86704 HEP B CORE ANTIBODY TOTAL: CPT

## 2023-02-17 PROCEDURE — 85025 COMPLETE CBC W/AUTO DIFF WBC: CPT

## 2023-02-17 PROCEDURE — 87591 N.GONORRHOEAE DNA AMP PROB: CPT

## 2023-02-17 PROCEDURE — 87340 HEPATITIS B SURFACE AG IA: CPT

## 2023-02-17 PROCEDURE — 87491 CHLMYD TRACH DNA AMP PROBE: CPT

## 2023-02-17 PROCEDURE — 83036 HEMOGLOBIN GLYCOSYLATED A1C: CPT

## 2023-02-17 PROCEDURE — 86803 HEPATITIS C AB TEST: CPT

## 2023-02-17 PROCEDURE — 82306 VITAMIN D 25 HYDROXY: CPT

## 2023-02-17 PROCEDURE — 36415 COLL VENOUS BLD VENIPUNCTURE: CPT

## 2023-02-17 PROCEDURE — 86780 TREPONEMA PALLIDUM: CPT

## 2023-02-17 PROCEDURE — 87389 HIV-1 AG W/HIV-1&-2 AB AG IA: CPT

## 2023-02-17 PROCEDURE — 87536 HIV-1 QUANT&REVRSE TRNSCRPJ: CPT

## 2023-02-17 PROCEDURE — G0463: CPT

## 2023-02-17 PROCEDURE — 99214 OFFICE O/P EST MOD 30 MIN: CPT

## 2023-02-17 PROCEDURE — 86706 HEP B SURFACE ANTIBODY: CPT

## 2023-02-17 PROCEDURE — 81001 URINALYSIS AUTO W/SCOPE: CPT

## 2023-02-17 PROCEDURE — 86709 HEPATITIS A IGM ANTIBODY: CPT

## 2023-02-17 PROCEDURE — 86708 HEPATITIS A ANTIBODY: CPT

## 2023-02-17 RX ORDER — IBUPROFEN 400 MG/1
400 TABLET, FILM COATED ORAL 3 TIMES DAILY
Qty: 21 | Refills: 0 | Status: COMPLETED | COMMUNITY
Start: 2022-02-01 | End: 2023-02-17

## 2023-02-17 NOTE — HISTORY OF PRESENT ILLNESS
[Follow-Up] : Follow-Up [Excellent] : Excellent [Percent Adherence: _____ %] : [unfilled]% adherence [Yes, specify: ______________] : Yes, specify: [unfilled] [No] : No [FreeTextEntry1] : Patient here today for PrEP f/u\par \par queenie [id number] < 8 min\par \par Adherence: Patient reports taking medication everyday, he reports that he missed one dose. 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 \par \par Social/Recent Sexual Hx: MSM, verse. Patient reports 5 sexual partners in the past 3 month. Patient reports using condoms with sexual partners. Last sexual encounter was yesterday\par \par Patient reports he has made big lifestyle changes, he has improved his diet and has been exercising.\par \par No signs/symptoms of acute HIV. No fever, myalgias, throat pain, meningismus.\par Pt denies any SOB, cough, chest pain, palpitations, N/V/D/C, fever, or chills. No other health concerns today. \par

## 2023-02-17 NOTE — DISCUSSION/SUMMARY
[15 min] : 15 min [HIV Education] : HIV Education [Universal Precautions] : universal precautions [Treatment Education] : treatment education [Treatment Adherence] : treatment adherence [Anticipatory Guidance] : anticipatory guidance [Sexuality/Safer Sex] : sexuality/safer sex [Partner Notification Info/Discussion] : partner notification info/discussion [Calendar/Diary] : calendar/diary [Frequent F/U] : frequent f/u [Condoms] : condoms [Risk Reduction] : risk reduction [PrEP/PEP] : PrEP/PEP [The Topics Listed Above] : the topics listed above [The patient was able to ask questions and explain these concepts in his/her own words] : the patient was able to ask questions and explain these concepts in his/her own words [Prognosis] : prognosis [Pre-conception Counseling] : pre-conception counseling

## 2023-02-17 NOTE — ASSESSMENT
[FreeTextEntry1] : \par Commended patient for job well done with lifestyle modifications, big difference in weight and blood pressure - improved. Encouraged to continue with lifestyle changes. Advised patient to call with any questions or concerns. Patient verbalized understanding.

## 2023-02-17 NOTE — REASON FOR VISIT
[Routine Follow-Up] : a routine follow-up visit for [Patient] : patient [[employer] ] : provided by [employer]   [TWNoteComboBox1] : [place]

## 2023-02-17 NOTE — SOCIAL HISTORY
[Sexually Active] : The patient is sexually active [Sometimes] : sometimes [Oral-Receptive (pt. mouth)] : oral-receptive (pt. mouth) [Anal-Receptive (pt anus)] : anal-receptive (pt anus) [To Pt Genitalia] : pt genitalia [To Pt Anus] : pt anus [To Pt Penis] : pt penis [Partner Aware?] : Partner Aware? Yes [Partnership for Health – Safe Sex Evidence Based Intervention] : The Partnership for Health Safe Sex Evidence Based Intervention was applied [Positive Reinforcement of Safe Behavior Message] : and a positive reinforcement of safe behavior message was provided. [Monogamous] : is not monogamous [Male ___] : [unfilled] male [Date of most recent sexual encounter ___] : ~His/Her~ most recent sexual encounter was [unfilled] [de-identified] : Verse

## 2023-02-18 DIAGNOSIS — R73.03 PREDIABETES.: ICD-10-CM

## 2023-02-18 LAB
25(OH)D3 SERPL-MCNC: 25.2 NG/ML
ALBUMIN SERPL ELPH-MCNC: 4.5 G/DL
ALP BLD-CCNC: 67 U/L
ALT SERPL-CCNC: 18 U/L
ANION GAP SERPL CALC-SCNC: 12 MMOL/L
AST SERPL-CCNC: 19 U/L
BASOPHILS # BLD AUTO: 0.02 K/UL
BASOPHILS NFR BLD AUTO: 0.4 %
BILIRUB SERPL-MCNC: 0.5 MG/DL
BUN SERPL-MCNC: 25 MG/DL
C TRACH RRNA SPEC QL NAA+PROBE: NOT DETECTED
CALCIUM SERPL-MCNC: 9 MG/DL
CHLORIDE SERPL-SCNC: 105 MMOL/L
CO2 SERPL-SCNC: 24 MMOL/L
CREAT SERPL-MCNC: 0.91 MG/DL
EGFR: 107 ML/MIN/1.73M2
EOSINOPHIL # BLD AUTO: 0.08 K/UL
EOSINOPHIL NFR BLD AUTO: 1.8 %
ESTIMATED AVERAGE GLUCOSE: 117 MG/DL
HBA1C MFR BLD HPLC: 5.7 %
HBV CORE IGG+IGM SER QL: NONREACTIVE
HBV SURFACE AB SERPL IA-ACNC: 115.8 MIU/ML
HBV SURFACE AG SER QL: NONREACTIVE
HCT VFR BLD CALC: 45.1 %
HCV AB SER QL: NONREACTIVE
HCV S/CO RATIO: 0.09 S/CO
HEPATITIS A IGG ANTIBODY: REACTIVE
HGB BLD-MCNC: 15 G/DL
HIV1 RNA # SERPL NAA+PROBE: NORMAL
HIV1 RNA # SERPL NAA+PROBE: NORMAL COPIES/ML
HIV1+2 AB SPEC QL IA.RAPID: NONREACTIVE
IMM GRANULOCYTES NFR BLD AUTO: 0.2 %
LYMPHOCYTES # BLD AUTO: 1.79 K/UL
LYMPHOCYTES NFR BLD AUTO: 40 %
MAN DIFF?: NORMAL
MCHC RBC-ENTMCNC: 30.9 PG
MCHC RBC-ENTMCNC: 33.3 GM/DL
MCV RBC AUTO: 92.8 FL
MONOCYTES # BLD AUTO: 0.4 K/UL
MONOCYTES NFR BLD AUTO: 8.9 %
N GONORRHOEA RRNA SPEC QL NAA+PROBE: NOT DETECTED
NEUTROPHILS # BLD AUTO: 2.18 K/UL
NEUTROPHILS NFR BLD AUTO: 48.7 %
PLATELET # BLD AUTO: 203 K/UL
POTASSIUM SERPL-SCNC: 4.2 MMOL/L
PROT SERPL-MCNC: 7.4 G/DL
RBC # BLD: 4.86 M/UL
RBC # FLD: 12.7 %
SODIUM SERPL-SCNC: 141 MMOL/L
SOURCE AMPLIFICATION: NORMAL
T PALLIDUM AB SER QL IA: NEGATIVE
VIRAL LOAD INTERP: NORMAL
VIRAL LOAD LOG: NORMAL LG COP/ML
WBC # FLD AUTO: 4.48 K/UL

## 2023-02-20 LAB
APPEARANCE: ABNORMAL
BILIRUBIN URINE: NEGATIVE
BLOOD URINE: NEGATIVE
C TRACH RRNA SPEC QL NAA+PROBE: NOT DETECTED
C TRACH RRNA SPEC QL NAA+PROBE: NOT DETECTED
COLOR: ABNORMAL
GLUCOSE QUALITATIVE U: NEGATIVE
KETONES URINE: NEGATIVE
LEUKOCYTE ESTERASE URINE: ABNORMAL
N GONORRHOEA RRNA SPEC QL NAA+PROBE: NOT DETECTED
N GONORRHOEA RRNA SPEC QL NAA+PROBE: NOT DETECTED
NITRITE URINE: NEGATIVE
PH URINE: 6
PROTEIN URINE: NORMAL
SOURCE ANAL: NORMAL
SOURCE ORAL: NORMAL
SPECIFIC GRAVITY URINE: 1.03
UROBILINOGEN URINE: NORMAL

## 2023-02-21 DIAGNOSIS — Z11.3 ENCOUNTER FOR SCREENING FOR INFECTIONS WITH A PREDOMINANTLY SEXUAL MODE OF TRANSMISSION: ICD-10-CM

## 2023-02-21 DIAGNOSIS — Z29.9 ENCOUNTER FOR PROPHYLACTIC MEASURES, UNSPECIFIED: ICD-10-CM

## 2023-02-24 LAB — ANAL PAP CYTOLOGY: NORMAL

## 2023-02-28 ENCOUNTER — APPOINTMENT (OUTPATIENT)
Dept: COLORECTAL SURGERY | Facility: CLINIC | Age: 43
End: 2023-02-28
Payer: MEDICAID

## 2023-02-28 VITALS
WEIGHT: 234 LBS | TEMPERATURE: 98.1 F | BODY MASS INDEX: 32.76 KG/M2 | DIASTOLIC BLOOD PRESSURE: 80 MMHG | SYSTOLIC BLOOD PRESSURE: 128 MMHG | HEART RATE: 71 BPM | HEIGHT: 71 IN

## 2023-02-28 PROCEDURE — 99203 OFFICE O/P NEW LOW 30 MIN: CPT | Mod: 25

## 2023-02-28 PROCEDURE — 46600 DIAGNOSTIC ANOSCOPY SPX: CPT

## 2023-02-28 NOTE — HISTORY OF PRESENT ILLNESS
[FreeTextEntry1] : 43yo [place] speaking male presents for initial evaluation\par \par PMH body skin itching/rashes (follows with derm)\par Denies h/o sleep apnea (does not use CPAP machine)\par Denies PSH\par \par Referred by JEET Asher\par \par HIV negative, on Truvada PREP, MSM, admits to insertive and receptive anal sex (usually insertive intercourse)\par Seen by JEET Asher 2/17/2023, pap smear performed [facility]\par Patient was referred for high resolution anoscopy. \par \par Prior pap 20 years ago abnormal, per patient his doctor burned off a wart from perianal skin. \par \par Currently he feels burning and extra skin in anal area.\par Denies pain. \par \par Daily BM, typically no issues with diarrhea or constipation. \par If strains or constipated, he will will see BRB dripping into toilet bowl. Occurs 1-2x per year. Stops on own. \par Never had a colonoscopy\par Denies family history of CRC or IBD\par \par Denies ASA or anticoagulation use.

## 2023-02-28 NOTE — HISTORY OF PRESENT ILLNESS
[FreeTextEntry1] : 41yo Burkinan speaking male presents for initial evaluation\par \par PMH body skin itching/rashes (follows with derm)\par Denies h/o sleep apnea (does not use CPAP machine)\par Denies PSH\par \par Referred by JEET Asher\par \par HIV negative, on Truvada PREP, MSM, admits to insertive and receptive anal sex (usually insertive intercourse)\par Seen by JEET Asher 2/17/2023, pap smear performed [facility]\par Patient was referred for high resolution anoscopy. \par \par Prior pap 20 years ago abnormal, per patient his doctor burned off a wart from perianal skin. \par \par Currently he feels burning and extra skin in anal area.\par Denies pain. \par \par Daily BM, typically no issues with diarrhea or constipation. \par If strains or constipated, he will will see BRB dripping into toilet bowl. Occurs 1-2x per year. Stops on own. \par Never had a colonoscopy\par Denies family history of CRC or IBD\par \par Denies ASA or anticoagulation use.

## 2023-02-28 NOTE — REVIEW OF SYSTEMS
[As Noted in HPI] : as noted in HPI [Nocturia] : nocturia [Anxiety] : anxiety [Depression] : depression [Negative] : Heme/Lymph [FreeTextEntry9] : muscle weakness [de-identified] : rash [de-identified] : stress [FreeTextEntry1] : as per HPI

## 2023-02-28 NOTE — REVIEW OF SYSTEMS
[As Noted in HPI] : as noted in HPI [Nocturia] : nocturia [Anxiety] : anxiety [Depression] : depression [Negative] : Heme/Lymph [FreeTextEntry9] : muscle weakness [de-identified] : rash [de-identified] : stress [FreeTextEntry1] : as per HPI

## 2023-02-28 NOTE — PHYSICAL EXAM
[FreeTextEntry1] : Medical assistant present for duration of physical examination\par \par General no acute distress, alert and oriented\par Psych calm, pleasant demeanor, responding appropriately to questions\par Nonlabored breathing\par Ambulating without assistance\par Skin diffuse rash in various parts of body\par \par Anorectal Exam:\par Inspection skin changes as above, left posterior skin tag, no perianal skin lesions\par SALAZAR nontender, no masses palpated, no blood on gloved finger, soft lesions palpated at anal verge\par \par Procedure: Anoscopy\par \par Pre procedure Diagnosis: anal dysplasia, anal lesions\par Post procedure Diagnosis: anal dysplasia, anal lesions\par Anesthesia: none\par Estimated blood loss: none\par Specimen: none\par Complications: none\par \par Consent obtained. Anoscopy was performed by passing a lighted anoscope with lubricant jelly into the anal canal and the entire anal mucosal surface was inspected. Findings included no fissure, mild internal hemorrhoids, small left lateral hypopigmented lesion, right sided anterior and posterior anal canal lesions. \par \par Patient tolerated examination and procedure well.\par \par \par

## 2023-02-28 NOTE — ASSESSMENT
[FreeTextEntry1] : Exam findings and diagnosis were discussed at length with patient.\par \par I have reviewed with the patient the clinical and natural history of human papilloma virus and its relationship to the anus. The risks and associated consequences including sexual transmission, anal warts, anal dysplasia and risk of anal cancer have been outlined. The need for long term surveillance and follow up have been detailed. The treatment options were discussed. The patient wishes to proceed with surveillance and management of identified visible/palpable lesions if identified.\par \par Recommend high resolution anoscopy, excision and fulguration of perianal lesions.\par The nature of the procedure as well as risks and benefits were reviewed with the patient. Risk/benefits/alternatives discussed at length, including but not limited to pain, bleeding, infection, swelling, recurrence of symptoms, need for future surgery, scarring and anal stenosis, and risk of alteration of bowel habits, which may be temporary or permanent. Postprocedural expectations regarding pain, wound cosmesis, and wound healing was discussed.  The preoperative, intraoperative, and postoperative management were discussed with the patient in detail. All questions were answered, patient expressed understanding, and would like to proceed with the proposed surgery. Informed consent was obtained. Ambulatory surgery instructions were given to patient\par \par Guyanese-speaking staff present for duration of patient encounter\par

## 2023-02-28 NOTE — ASSESSMENT
[FreeTextEntry1] : Exam findings and diagnosis were discussed at length with patient.\par \par I have reviewed with the patient the clinical and natural history of human papilloma virus and its relationship to the anus. The risks and associated consequences including sexual transmission, anal warts, anal dysplasia and risk of anal cancer have been outlined. The need for long term surveillance and follow up have been detailed. The treatment options were discussed. The patient wishes to proceed with surveillance and management of identified visible/palpable lesions if identified.\par \par Recommend high resolution anoscopy, excision and fulguration of perianal lesions.\par The nature of the procedure as well as risks and benefits were reviewed with the patient. Risk/benefits/alternatives discussed at length, including but not limited to pain, bleeding, infection, swelling, recurrence of symptoms, need for future surgery, scarring and anal stenosis, and risk of alteration of bowel habits, which may be temporary or permanent. Postprocedural expectations regarding pain, wound cosmesis, and wound healing was discussed.  The preoperative, intraoperative, and postoperative management were discussed with the patient in detail. All questions were answered, patient expressed understanding, and would like to proceed with the proposed surgery. Informed consent was obtained. Ambulatory surgery instructions were given to patient\par \par [place]ese-speaking staff present for duration of patient encounter\par

## 2023-03-16 ENCOUNTER — TRANSCRIPTION ENCOUNTER (OUTPATIENT)
Age: 43
End: 2023-03-16

## 2023-03-17 ENCOUNTER — RESULT REVIEW (OUTPATIENT)
Age: 43
End: 2023-03-17

## 2023-03-17 ENCOUNTER — OUTPATIENT (OUTPATIENT)
Dept: OUTPATIENT SERVICES | Facility: HOSPITAL | Age: 43
LOS: 1 days | Discharge: HOME | End: 2023-03-17
Payer: MEDICAID

## 2023-03-17 ENCOUNTER — APPOINTMENT (OUTPATIENT)
Dept: COLORECTAL SURGERY | Facility: CLINIC | Age: 43
End: 2023-03-17

## 2023-03-17 ENCOUNTER — TRANSCRIPTION ENCOUNTER (OUTPATIENT)
Age: 43
End: 2023-03-17

## 2023-03-17 VITALS
DIASTOLIC BLOOD PRESSURE: 81 MMHG | RESPIRATION RATE: 14 BRPM | HEART RATE: 74 BPM | WEIGHT: 235.89 LBS | TEMPERATURE: 98 F | SYSTOLIC BLOOD PRESSURE: 116 MMHG | HEIGHT: 72 IN | OXYGEN SATURATION: 97 %

## 2023-03-17 VITALS
SYSTOLIC BLOOD PRESSURE: 135 MMHG | TEMPERATURE: 97 F | DIASTOLIC BLOOD PRESSURE: 82 MMHG | RESPIRATION RATE: 14 BRPM | HEART RATE: 60 BPM | OXYGEN SATURATION: 98 %

## 2023-03-17 DIAGNOSIS — G89.18 OTHER ACUTE POSTPROCEDURAL PAIN: ICD-10-CM

## 2023-03-17 PROCEDURE — 88341 IMHCHEM/IMCYTCHM EA ADD ANTB: CPT | Mod: 26,59

## 2023-03-17 PROCEDURE — 88360 TUMOR IMMUNOHISTOCHEM/MANUAL: CPT | Mod: 26

## 2023-03-17 PROCEDURE — 88305 TISSUE EXAM BY PATHOLOGIST: CPT | Mod: 26

## 2023-03-17 PROCEDURE — 46607 DIAGNOSTIC ANOSCOPY & BIOPSY: CPT | Mod: GC

## 2023-03-17 PROCEDURE — 88342 IMHCHEM/IMCYTCHM 1ST ANTB: CPT | Mod: 26,59

## 2023-03-17 PROCEDURE — 88304 TISSUE EXAM BY PATHOLOGIST: CPT | Mod: 26

## 2023-03-17 PROCEDURE — 46924 DESTRUCTION ANAL LESION(S): CPT | Mod: GC

## 2023-03-17 RX ORDER — ACETAMINOPHEN 500 MG
650 TABLET ORAL ONCE
Refills: 0 | Status: COMPLETED | OUTPATIENT
Start: 2023-03-17 | End: 2023-03-17

## 2023-03-17 RX ORDER — EMTRICITABINE AND TENOFOVIR DISOPROXIL FUMARATE 200; 300 MG/1; MG/1
1 TABLET, FILM COATED ORAL
Qty: 0 | Refills: 0 | DISCHARGE

## 2023-03-17 RX ORDER — OXYCODONE HYDROCHLORIDE 5 MG/1
5 TABLET ORAL ONCE
Refills: 0 | Status: DISCONTINUED | OUTPATIENT
Start: 2023-03-17 | End: 2023-03-17

## 2023-03-17 RX ORDER — SODIUM CHLORIDE 9 MG/ML
1000 INJECTION, SOLUTION INTRAVENOUS
Refills: 0 | Status: DISCONTINUED | OUTPATIENT
Start: 2023-03-17 | End: 2023-03-17

## 2023-03-17 RX ADMIN — Medication 650 MILLIGRAM(S): at 08:55

## 2023-03-17 NOTE — BRIEF OPERATIVE NOTE - OPERATION/FINDINGS
Patient prepped and draped in sterile fashion. Local anesthesia injected around the anal canal. Exam performed under anesthesia noting multiple internal condylomas. Acetoacetic acid and lugols solution utilized to identify lesions, with direct visualization under high resolution anoscopy. Sharp dissection was performed for 4 lesions with largest being at the posterior left midline. Adequate hemostasis was achieved at the bases of the biopsy sites. Numerous anal condylomas were fulgurated with electrocautery. Adequate hemostasis was achieved. Surgicell left in anal canal. Patient prepped and draped in sterile fashion. Local anesthesia injected around the anal canal. Exam performed under anesthesia noting multiple internal condylomas. Acetic acid and lugols solution utilized to identify lesions, with direct visualization under high resolution anoscopy. Sharp dissection was performed for 4 lesions. Adequate hemostasis was achieved at the bases of the biopsy sites. Numerous anal condylomas were fulgurated with electrocautery. Adequate hemostasis was achieved. Surgicell left in anal canal.

## 2023-03-17 NOTE — ASU DISCHARGE PLAN (ADULT/PEDIATRIC) - ASU DC SPECIAL INSTRUCTIONSFT
Please follow up with Dr. Drummond in 1 week, please call to schedule an appointment.   Please follow instructions given to you by Dr. Drummond and her office.    Activity: Please resume normal activities such as walking, climbing stairs, and other activities of daily living. Refrain from intense exercise for at least 1 week.     Diet: please continue with high fiber diet.     Bleeding: Expect some bleeding after surgery. Spotting, dripping into toilet bowl, staining on your dressing - these are all expected up to a week or two after surgery.     Dressings: please keep in place for 1 day or until after your first bowel movement, whichever comes first. A piece of surgicell (cotton strip) has been placed within the anus, it will dissolve on its own and helps stop postoperative bleeding.     Sitz baths: please continue with sitz baths in a warm tub to help with pain and discomfort. Each soak should be for 15-20 minutes in a warm bath. You may repeat as many times as necessary.      Bowel movements: Please take colace 100mg twice daily for 2 weeks to facilitate solid, consistent bowel movements.     Medications: Please resume home medications. Please continue to hold blood thinners for 48 hours after the surgery (ex: aspirin, plavix), you may resume after 2 days. Do not place anything in the anus.     New medications:  You have been sent the following prescriptions to your pharmacy:  1. Percocet 325mg-5mg every 6 hours as needed for severe pain.   2. Colace 100mg - please take 1-2 times daily while taking percocet to prevent constipation.     General Discharge Instructions:  Please resume all regular home medications unless specifically advised not to take a particular medication. Also, please take any new medications as prescribed.  Please get plenty of rest, continue to ambulate several times per day, and drink adequate amounts of fluids. Avoid lifting weights greater than 5-10 lbs until you follow-up with your surgeon, who will instruct you further regarding activity restrictions.  Avoid driving or operating heavy machinery while taking pain medications.  Please follow-up with your surgeon and Primary Care Provider (PCP) as advised.    Incision Care:  *Please call your doctor or nurse practitioner if you have increased pain, swelling, redness, or drainage from the incision site.  *Avoid swimming and baths until your follow-up appointment.  *You may shower, and wash surgical incisions with a mild soap and warm water. Gently pat the area dry.    Warning Signs:  Please call your doctor or nurse practitioner if you experience the following:  *You experience new chest pain, pressure, squeezing or tightness.  *New or worsening cough, shortness of breath, or wheeze.  *If you are vomiting and cannot keep down fluids or your medications.  *You are getting dehydrated due to continued vomiting, diarrhea, or other reasons. Signs of dehydration include dry mouth, rapid heartbeat, or feeling dizzy or faint when standing.  *You see blood or dark/black material when you vomit or have a bowel movement.  *You experience burning when you urinate, have blood in your urine, or experience a discharge.  *Your pain is not improving within 8-12 hours or is not gone within 24 hours. Call or return immediately if your pain is getting worse, changes location, or moves to your chest or back.  *You have shaking chills, or fever greater than 101.5 degrees Fahrenheit or 38 degrees Celsius.  *Any change in your symptoms, or any new symptoms that concern you.

## 2023-03-17 NOTE — ASU DISCHARGE PLAN (ADULT/PEDIATRIC) - CARE PROVIDER_API CALL
Liana Drummond)  ColonRectal Surgery; Surgery  [address], [address]nd [address]  [place], [place] [address]  Phone: [phone]  Fax: [phone]  Follow Up Time: 1 week

## 2023-03-17 NOTE — BRIEF OPERATIVE NOTE - NSICDXBRIEFPROCEDURE_GEN_ALL_CORE_FT
PROCEDURES:  High resolution anoscopy 17-Mar-2023 08:44:40 exam under anesthesia, high resolution anoscopy for anal lesions, excision and fulguration of anal lesions Rashad Rodriguez

## 2023-03-20 ENCOUNTER — NON-APPOINTMENT (OUTPATIENT)
Age: 43
End: 2023-03-20

## 2023-03-22 LAB — SURGICAL PATHOLOGY STUDY: SIGNIFICANT CHANGE UP

## 2023-04-12 PROBLEM — J18.9 PNEUMONIA, UNSPECIFIED ORGANISM: Chronic | Status: ACTIVE | Noted: 2023-03-17

## 2023-05-11 ENCOUNTER — APPOINTMENT (OUTPATIENT)
Dept: COLORECTAL SURGERY | Facility: CLINIC | Age: 43
End: 2023-05-11
Payer: MEDICAID

## 2023-05-11 VITALS
DIASTOLIC BLOOD PRESSURE: 84 MMHG | HEIGHT: 71 IN | TEMPERATURE: 97.9 F | BODY MASS INDEX: 33.46 KG/M2 | HEART RATE: 69 BPM | WEIGHT: 239 LBS | SYSTOLIC BLOOD PRESSURE: 123 MMHG

## 2023-05-11 PROCEDURE — 46600 DIAGNOSTIC ANOSCOPY SPX: CPT

## 2023-05-11 NOTE — HISTORY OF PRESENT ILLNESS
[FreeTextEntry1] : 41 y/o Bangladeshi speaking M presents for follow up evaluation of anal dysplasia, s/p HRA with excision and fulguration of perianal lesions on 3/17/23\par \par PMH body skin itching/rashes (follows with derm)\par HIV negative, on Truvada PREP, MSM, admits to insertive and receptive anal sex (usually insertive intercourse)\par \par Surgical Pathology Report - Auth (Verified)\par Specimen(s) Submitted\par 1  SK TAG\par 2  GI ANAL BX\par 3  GI ANAL BX\par 4  GI ANAL BX\par 5  GI ANAL BX\par \par Final Diagnosis\par \par 1.  Left posterior perianal skin tag, excision:\par -   Fibroepithelial polyp, chronically inflamed.\par \par 2.  Posterior midline anal canal lesion, excision biopsy:\par -   Condyloma acuminatum with high-grade dysplasia, involving squamo-\par columnar mucosa at trasformation zone (see note).\par -   Brisk lymphoid reaction also present.\par \par 3.  Right lateral anal canal lesion, excision biopsy:\par - Condyloma acuminata with focal high-grade dysplasia involving\par metaplastic mucosa at transformation zone (see note)\par \par 4.  Anterior midline anal canal lesion, excision biopsy:\par -   Condyloma acuminatum with low-grade dysplasia.\par \par 5.  Right posterior anal canal lesion, biopsy:\par -   Condyloma acuminatum with dysplasia, minute fragment.\par \par Note: IHC for p16 and Ki67, reviewed (2A,3A).\par Verified by: Leonila Garcia M.D.\par \par Presents today for first postop visit. \par Pain for first week, took prescription pain meds.\par Had hard stools, took stool softeners and improved. Saw BRB during that time\par No pain or fevers.\par \par Daily BM, no diarrhea or constipation. \par \par Currently has not complaints.\par

## 2023-05-11 NOTE — ASSESSMENT
[FreeTextEntry1] : Recovered well, patient reassured\par I have reviewed with the patient the clinical and natural history of human papilloma virus and its relationship to the anus. The need for long term surveillance and follow up have been detailed. \par Started Gardasil series with PCP.\par Return in 2-3 months for anal pap smear and anoscopy.\par All questions were answered, patient expressed understanding, and is agreeable to this plan.\par Liberian-speaking staff present for duration of patient encounter\par

## 2023-05-11 NOTE — PHYSICAL EXAM
[FreeTextEntry1] : Medical assistant present for duration of physical examination\par \par General no acute distress, alert and oriented\par Psych in good spirits, responding appropriately to questions\par Nonlabored breathing\par Ambulating without assistance\par Skin diffuse rash in various parts of body\par \par Anorectal Exam:\par Inspection skin changes as above, no skin tags, no perianal skin lesions, well healed surgical site\par SALAZAR nontender, no masses palpated, no blood on gloved finger, no stenosis\par \par Procedure: Anoscopy\par \par Pre procedure Diagnosis: h/o anal dysplasia, anal lesions\par Post procedure Diagnosis: h/o anal dysplasia, anal lesions\par Anesthesia: none\par Estimated blood loss: none\par Specimen: none\par Complications: none\par \par Consent obtained. Anoscopy was performed by passing a lighted anoscope with lubricant jelly into the anal canal and the entire anal mucosal surface was inspected. Findings included no fissure, mild internal hemorrhoids, no visible lesions in anal canal\par \par Patient tolerated examination and procedure well.\par \par \par

## 2023-05-22 RX ORDER — DOCUSATE SODIUM 100 MG/1
100 CAPSULE ORAL 3 TIMES DAILY
Qty: 90 | Refills: 1 | Status: COMPLETED | COMMUNITY
Start: 2023-03-17 | End: 2023-05-22

## 2023-05-22 RX ORDER — OXYCODONE AND ACETAMINOPHEN 5; 325 MG/1; MG/1
5-325 TABLET ORAL
Qty: 18 | Refills: 0 | Status: COMPLETED | COMMUNITY
Start: 2023-03-17 | End: 2023-05-22

## 2023-05-23 ENCOUNTER — OUTPATIENT (OUTPATIENT)
Dept: OUTPATIENT SERVICES | Facility: HOSPITAL | Age: 43
LOS: 1 days | End: 2023-05-23
Payer: MEDICAID

## 2023-05-23 ENCOUNTER — APPOINTMENT (OUTPATIENT)
Dept: PEDIATRIC ALLERGY IMMUNOLOGY | Facility: CLINIC | Age: 43
End: 2023-05-23
Payer: MEDICAID

## 2023-05-23 VITALS
DIASTOLIC BLOOD PRESSURE: 79 MMHG | HEART RATE: 97 BPM | SYSTOLIC BLOOD PRESSURE: 128 MMHG | WEIGHT: 292 LBS | HEIGHT: 71 IN | OXYGEN SATURATION: 97 % | BODY MASS INDEX: 40.88 KG/M2

## 2023-05-23 DIAGNOSIS — Z00.00 ENCOUNTER FOR GENERAL ADULT MEDICAL EXAMINATION WITHOUT ABNORMAL FINDINGS: ICD-10-CM

## 2023-05-23 PROCEDURE — 87536 HIV-1 QUANT&REVRSE TRNSCRPJ: CPT

## 2023-05-23 PROCEDURE — 99214 OFFICE O/P EST MOD 30 MIN: CPT

## 2023-05-23 PROCEDURE — 87491 CHLMYD TRACH DNA AMP PROBE: CPT

## 2023-05-23 PROCEDURE — G0463: CPT

## 2023-05-23 PROCEDURE — 87389 HIV-1 AG W/HIV-1&-2 AB AG IA: CPT

## 2023-05-23 PROCEDURE — 85025 COMPLETE CBC W/AUTO DIFF WBC: CPT

## 2023-05-23 PROCEDURE — 36415 COLL VENOUS BLD VENIPUNCTURE: CPT

## 2023-05-23 PROCEDURE — 87591 N.GONORRHOEAE DNA AMP PROB: CPT

## 2023-05-23 PROCEDURE — 86780 TREPONEMA PALLIDUM: CPT

## 2023-05-23 NOTE — HISTORY OF PRESENT ILLNESS
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 \par \par Social/Recent Sexual Hx: MSM, verse. Patient reports 10 sexual partners in the past 3 month. Patient reports using condoms with sexual partners. Last sexual encounter was sunday\par \par Patient reports a bit of itch with testicles, he reports he has had penile wart for 6 months. Patient reports surgery with dr. braxton went very well. Patient is getting HPV vaccine from PCP, had first dose 1 month ago and getting second one soon. He has had calluses on felt have gone away since getting vaccine.\par \par No signs/symptoms of acute HIV. No fever, myalgias, throat pain, meningismus.\par Pt denies any SOB, cough, chest pain, palpitations, N/V/D/C, fever, or chills. No other health concerns today. \par

## 2023-05-23 NOTE — SOCIAL HISTORY
[Sexually Active] : The patient is sexually active [Sometimes] : sometimes [Oral-Receptive (pt. mouth)] : oral-receptive (pt. mouth) [Anal-Receptive (pt anus)] : anal-receptive (pt anus) [To Pt Genitalia] : pt genitalia [To Pt Anus] : pt anus [To Pt Penis] : pt penis [Partner Aware?] : Partner Aware? Yes [Partnership for Health – Safe Sex Evidence Based Intervention] : The Partnership for Health Safe Sex Evidence Based Intervention was applied [Positive Reinforcement of Safe Behavior Message] : and a positive reinforcement of safe behavior message was provided. [Monogamous] : is not monogamous [Male ___] : [unfilled] male [Date of most recent sexual encounter ___] : ~His/Her~ most recent sexual encounter was [unfilled] [de-identified] : Verse

## 2023-05-23 NOTE — DISCUSSION/SUMMARY
[15 min] : 15 min [HIV Education] : HIV Education [Universal Precautions] : universal precautions [Treatment Education] : treatment education [Treatment Adherence] : treatment adherence [Anticipatory Guidance] : anticipatory guidance [Prognosis] : prognosis [Pre-conception Counseling] : pre-conception counseling [Sexuality/Safer Sex] : sexuality/safer sex [Partner Notification Info/Discussion] : partner notification info/discussion [Calendar/Diary] : calendar/diary [Frequent F/U] : frequent f/u [Condoms] : condoms [Risk Reduction] : risk reduction [PrEP/PEP] : PrEP/PEP [The Topics Listed Above] : the topics listed above [The patient was able to ask questions and explain these concepts in his/her own words] : the patient was able to ask questions and explain these concepts in his/her own words

## 2023-05-23 NOTE — ASSESSMENT
[FreeTextEntry1] : \par Anal Dysplasia: well controlled, continue care under specialist. Advised patient to call with any questions or concerns. Patient verbalized understanding.

## 2023-05-24 DIAGNOSIS — Z11.3 ENCOUNTER FOR SCREENING FOR INFECTIONS WITH A PREDOMINANTLY SEXUAL MODE OF TRANSMISSION: ICD-10-CM

## 2023-05-24 DIAGNOSIS — Z29.9 ENCOUNTER FOR PROPHYLACTIC MEASURES, UNSPECIFIED: ICD-10-CM

## 2023-05-24 LAB
ALBUMIN SERPL ELPH-MCNC: 4.6 G/DL
ALP BLD-CCNC: 71 U/L
ALT SERPL-CCNC: 19 U/L
ANION GAP SERPL CALC-SCNC: 12 MMOL/L
APPEARANCE: CLEAR
AST SERPL-CCNC: 18 U/L
BILIRUB SERPL-MCNC: 0.3 MG/DL
BILIRUBIN URINE: NEGATIVE
BLOOD URINE: NEGATIVE
BUN SERPL-MCNC: 21 MG/DL
C TRACH RRNA SPEC QL NAA+PROBE: NOT DETECTED
C TRACH RRNA SPEC QL NAA+PROBE: NOT DETECTED
CALCIUM SERPL-MCNC: 9.8 MG/DL
CHLORIDE SERPL-SCNC: 104 MMOL/L
CO2 SERPL-SCNC: 24 MMOL/L
COLOR: YELLOW
CREAT SERPL-MCNC: 0.86 MG/DL
EGFR: 110 ML/MIN/1.73M2
GLUCOSE QUALITATIVE U: NEGATIVE MG/DL
HIV1 RNA # SERPL NAA+PROBE: NORMAL
HIV1 RNA # SERPL NAA+PROBE: NORMAL COPIES/ML
HIV1+2 AB SPEC QL IA.RAPID: NONREACTIVE
KETONES URINE: NEGATIVE MG/DL
LEUKOCYTE ESTERASE URINE: NEGATIVE
N GONORRHOEA RRNA SPEC QL NAA+PROBE: NOT DETECTED
N GONORRHOEA RRNA SPEC QL NAA+PROBE: NOT DETECTED
NITRITE URINE: NEGATIVE
PH URINE: 7
POTASSIUM SERPL-SCNC: 4.5 MMOL/L
PROT SERPL-MCNC: 7.1 G/DL
PROTEIN URINE: NEGATIVE MG/DL
SODIUM SERPL-SCNC: 140 MMOL/L
SOURCE AMPLIFICATION: NORMAL
SOURCE ANAL: NORMAL
SPECIFIC GRAVITY URINE: 1.02
T PALLIDUM AB SER QL IA: NEGATIVE
UROBILINOGEN URINE: 0.2 MG/DL
VIRAL LOAD INTERP: NORMAL
VIRAL LOAD LOG: NORMAL LG COP/ML

## 2023-05-26 ENCOUNTER — OUTPATIENT (OUTPATIENT)
Dept: OUTPATIENT SERVICES | Facility: HOSPITAL | Age: 43
LOS: 1 days | End: 2023-05-26
Payer: MEDICAID

## 2023-05-26 ENCOUNTER — APPOINTMENT (OUTPATIENT)
Dept: PEDIATRIC ALLERGY IMMUNOLOGY | Facility: CLINIC | Age: 43
End: 2023-05-26
Payer: MEDICAID

## 2023-05-26 DIAGNOSIS — Z20.2 CONTACT WITH AND (SUSPECTED) EXPOSURE TO INFECTIONS WITH A PREDOMINANTLY SEXUAL MODE OF TRANSMISSION: ICD-10-CM

## 2023-05-26 DIAGNOSIS — Z20.6 CONTACT WITH AND (SUSPECTED) EXPOSURE TO HUMAN IMMUNODEFICIENCY VIRUS [HIV]: ICD-10-CM

## 2023-05-26 DIAGNOSIS — A54.9 GONOCOCCAL INFECTION, UNSPECIFIED: ICD-10-CM

## 2023-05-26 DIAGNOSIS — Z00.00 ENCOUNTER FOR GENERAL ADULT MEDICAL EXAMINATION WITHOUT ABNORMAL FINDINGS: ICD-10-CM

## 2023-05-26 LAB
C TRACH RRNA SPEC QL NAA+PROBE: NOT DETECTED
N GONORRHOEA RRNA SPEC QL NAA+PROBE: DETECTED
SOURCE ORAL: NORMAL

## 2023-05-26 PROCEDURE — 96372 THER/PROPH/DIAG INJ SC/IM: CPT

## 2023-05-26 PROCEDURE — 99213 OFFICE O/P EST LOW 20 MIN: CPT

## 2023-05-26 PROCEDURE — G0463: CPT

## 2023-05-26 RX ORDER — CEFTRIAXONE 500 MG/1
500 INJECTION, POWDER, FOR SOLUTION INTRAMUSCULAR; INTRAVENOUS
Qty: 0 | Refills: 0 | Status: COMPLETED | OUTPATIENT
Start: 2023-05-26

## 2023-05-26 RX ADMIN — CEFTRIAXONE SODIUM 1 MG: 500 INJECTION, POWDER, FOR SOLUTION INTRAMUSCULAR; INTRAVENOUS at 00:00

## 2023-05-26 NOTE — HISTORY OF PRESENT ILLNESS
[Follow-Up] : Follow-Up [Excellent] : Excellent [Percent Adherence: _____ %] : [unfilled]% adherence [Yes, specify: ______________] : Yes, specify: [unfilled] [No] : No [FreeTextEntry1] : ADDIS SEALS is a 42 year old, male seen on 05/26/2023 for  GC treatment. \par \par Pt seen on 5/23/23 and found to have Gonorrhea orally, currently asymptomatic. \par \par Adherence: Patient reports taking medication everyday, he changed pharmacy and had trouble with it missed 5 doses because of this otherwise taking everyday\par \par Social/Recent Sexual Hx: MSM, verse. Patient reports 10 sexual partners in the past 3 month. In the past 1 month 5  partners and using condoms sometimes.  Patient reports using condoms with sexual partners. Last sexual encounter was sunday\par \par \par No signs/symptoms of acute HIV. No fever, myalgias, throat pain, meningismus.\par Pt denies any SOB, cough, chest pain, palpitations, N/V/D/C, fever, or chills. No other health concerns today. \par

## 2023-05-26 NOTE — SOCIAL HISTORY
[Sexually Active] : The patient is sexually active [Sometimes] : sometimes [Oral-Receptive (pt. mouth)] : oral-receptive (pt. mouth) [Anal-Receptive (pt anus)] : anal-receptive (pt anus) [To Pt Genitalia] : pt genitalia [To Pt Anus] : pt anus [To Pt Penis] : pt penis [Male ___] : [unfilled] male [Date of most recent sexual encounter ___] : ~His/Her~ most recent sexual encounter was [unfilled] [Partner Aware?] : Partner Aware? Yes [Partnership for Health – Safe Sex Evidence Based Intervention] : The Partnership for Health Safe Sex Evidence Based Intervention was applied [Positive Reinforcement of Safe Behavior Message] : and a positive reinforcement of safe behavior message was provided. [Monogamous] : is not monogamous [de-identified] : Verse

## 2023-05-26 NOTE — PHYSICAL EXAM
[Alert] : alert [Well Nourished] : well nourished [Healthy Appearance] : healthy appearance [No Acute Distress] : no acute distress [Well Developed] : well developed [Normal Voice/Communication] : normal voice communication [Normal Pupil & Iris Size/Symmetry] : normal pupil and iris size and symmetry [Skin Intact] : skin intact  [Normal Mood] : mood was normal [Normal Affect] : affect was normal [Judgment and Insight Age Appropriate] : judgement and insight is age appropriate [Alert, Awake, Oriented as Age-Appropriate] : alert, awake, oriented as age appropriate

## 2023-05-31 DIAGNOSIS — A54.9 GONOCOCCAL INFECTION, UNSPECIFIED: ICD-10-CM

## 2023-08-09 DIAGNOSIS — Z82.49 FAMILY HISTORY OF ISCHEMIC HEART DISEASE AND OTHER DISEASES OF THE CIRCULATORY SYSTEM: ICD-10-CM

## 2023-08-09 DIAGNOSIS — G47.30 SLEEP APNEA, UNSPECIFIED: ICD-10-CM

## 2023-08-09 DIAGNOSIS — Z80.49 FAMILY HISTORY OF MALIGNANT NEOPLASM OF OTHER GENITAL ORGANS: ICD-10-CM

## 2023-08-10 ENCOUNTER — APPOINTMENT (OUTPATIENT)
Dept: COLORECTAL SURGERY | Facility: CLINIC | Age: 43
End: 2023-08-10
Payer: MEDICAID

## 2023-08-10 VITALS
WEIGHT: 248 LBS | SYSTOLIC BLOOD PRESSURE: 127 MMHG | HEIGHT: 71 IN | TEMPERATURE: 98.6 F | BODY MASS INDEX: 34.72 KG/M2 | DIASTOLIC BLOOD PRESSURE: 79 MMHG

## 2023-08-10 DIAGNOSIS — K62.82 DYSPLASIA OF ANUS: ICD-10-CM

## 2023-08-10 DIAGNOSIS — A63.0 ANOGENITAL (VENEREAL) WARTS: ICD-10-CM

## 2023-08-10 PROCEDURE — 46600 DIAGNOSTIC ANOSCOPY SPX: CPT

## 2023-08-10 NOTE — ASSESSMENT
[FreeTextEntry1] : Continue surveillance. No acute findings on office anoscopy. F/u pap smear results. Further recommendations pending cytology. All questions were answered, patient expressed understanding, and is agreeable to this plan. Bahraini-speaking staff present for duration of patient encounter

## 2023-08-10 NOTE — HISTORY OF PRESENT ILLNESS
[FreeTextEntry1] : 43 yo Wallisian speaking M presents in follow up of anal dysplasia, s/p HRA with excision and fulguration of perianal lesions 3/17/23 PMH body skin itches / rashes (Follows with Derm) HIV (-), on Truvada PrEP, MSM,admits to insertive and receptive anal sex (usually insertive). Recently tested (+) for oral GC, currently asymptomatic  Patient has started Gardasil series with PCP - has completed 2 of 3 shots so far.  Surgical Pathology Report - Auth (Verified) Specimen(s) Submitted 1 SK TAG 2 GI ANAL BX 3 GI ANAL BX 4 GI ANAL BX 5 GI ANAL BX Final Diagnosis 1. Left posterior perianal skin tag, excision: - Fibroepithelial polyp, chronically inflamed. 2. Posterior midline anal canal lesion, excision biopsy: - Condyloma acuminatum with high-grade dysplasia, involving squamo- columnar mucosa at trasformation zone (see note). - Brisk lymphoid reaction also present. 3. Right lateral anal canal lesion, excision biopsy: - Condyloma acuminata with focal high-grade dysplasia involving metaplastic mucosa at transformation zone (see note) 4. Anterior midline anal canal lesion, excision biopsy: - Condyloma acuminatum with low-grade dysplasia. 5. Right posterior anal canal lesion, biopsy: - Condyloma acuminatum with dysplasia, minute fragment. Note: IHC for p16 and Ki67, reviewed (2A,3A). Verified by: Leonila Garcia M.D.  Seen in first post operative evaluation 5/11/23, exam noted well healed surgical site. Mild internal hemorrhoids. No visible lesions in the anal canal.   Patient returns today in follow up  Doing well, no complaints Moving bowels regularly. Denies anorectal pain, itching, burning, bleeding or lumps/bumps Denies ASA/NSAID use

## 2023-08-10 NOTE — PHYSICAL EXAM
[FreeTextEntry1] : Medical assistant present for duration of physical examination  General no acute distress, alert and oriented Psych pleasant, responding appropriately to questions Nonlabored breathing Ambulating without assistance Skin diffuse rash in various parts of body  Anorectal Exam: Inspection skin changes as above, no skin tags, no perianal skin lesions, minimal external hemorrhoids without inflammation  Anal pap smear performed.   SALAZAR nontender, no masses palpated, no blood on gloved finger  Procedure: Anoscopy  Pre procedure Diagnosis: h/o anal dysplasia/anal lesions Post procedure Diagnosis: h/o anal dysplasia/anal lesions Anesthesia: none Estimated blood loss: none Specimen: none Complications: none  Consent obtained. Anoscopy was performed by passing a lighted anoscope with lubricant jelly into the anal canal and the entire anal mucosal surface was inspected. Findings included no fissure, internal hemorrhoids without inflammation, no visible lesions in anal canal  Patient tolerated examination and procedure well.

## 2023-08-22 ENCOUNTER — OUTPATIENT (OUTPATIENT)
Dept: OUTPATIENT SERVICES | Facility: HOSPITAL | Age: 43
LOS: 1 days | End: 2023-08-22
Payer: MEDICAID

## 2023-08-22 ENCOUNTER — MED ADMIN CHARGE (OUTPATIENT)
Age: 43
End: 2023-08-22

## 2023-08-22 ENCOUNTER — APPOINTMENT (OUTPATIENT)
Dept: PEDIATRIC ALLERGY IMMUNOLOGY | Facility: CLINIC | Age: 43
End: 2023-08-22
Payer: MEDICAID

## 2023-08-22 VITALS
BODY MASS INDEX: 34.3 KG/M2 | WEIGHT: 245 LBS | HEIGHT: 71 IN | OXYGEN SATURATION: 95 % | DIASTOLIC BLOOD PRESSURE: 80 MMHG | SYSTOLIC BLOOD PRESSURE: 121 MMHG | HEART RATE: 91 BPM | TEMPERATURE: 99 F

## 2023-08-22 DIAGNOSIS — Z23 ENCOUNTER FOR IMMUNIZATION: ICD-10-CM

## 2023-08-22 DIAGNOSIS — Z00.00 ENCOUNTER FOR GENERAL ADULT MEDICAL EXAMINATION WITHOUT ABNORMAL FINDINGS: ICD-10-CM

## 2023-08-22 DIAGNOSIS — R85.612 LOW GRADE SQUAMOUS INTRAEPITHELIAL LESION ON CYTOLOGIC SMEAR OF ANUS (LGSIL): ICD-10-CM

## 2023-08-22 PROCEDURE — 87536 HIV-1 QUANT&REVRSE TRNSCRPJ: CPT

## 2023-08-22 PROCEDURE — 90471 IMMUNIZATION ADMIN: CPT

## 2023-08-22 PROCEDURE — 87389 HIV-1 AG W/HIV-1&-2 AB AG IA: CPT

## 2023-08-22 PROCEDURE — 80053 COMPREHEN METABOLIC PANEL: CPT

## 2023-08-22 PROCEDURE — 87491 CHLMYD TRACH DNA AMP PROBE: CPT

## 2023-08-22 PROCEDURE — 36415 COLL VENOUS BLD VENIPUNCTURE: CPT

## 2023-08-22 PROCEDURE — 86780 TREPONEMA PALLIDUM: CPT

## 2023-08-22 PROCEDURE — 85025 COMPLETE CBC W/AUTO DIFF WBC: CPT

## 2023-08-22 PROCEDURE — 99214 OFFICE O/P EST MOD 30 MIN: CPT

## 2023-08-22 PROCEDURE — 87591 N.GONORRHOEAE DNA AMP PROB: CPT

## 2023-08-22 PROCEDURE — ZZZZZ: CPT

## 2023-08-22 PROCEDURE — G0463: CPT

## 2023-08-22 NOTE — HISTORY OF PRESENT ILLNESS
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   Social/Recent Sexual Hx: MSM, verse. Patient reports 10 sexual partners in the past 3 month. Patient reports using condoms with sexual partners. Last sexual encounter was this past sudnay  Patient has had two hpv vaccines - 1 in april, 1 in july, and the last is due in october   No signs/symptoms of acute HIV. No fever, myalgias, throat pain, meningismus. Pt denies any SOB, cough, chest pain, palpitations, N/V/D/C, fever, or chills. No other health concerns today.

## 2023-08-22 NOTE — ASSESSMENT
[FreeTextEntry1] : Blood work was collected in office at time of visit.   Anal Dysplasia: well controlled, continue care under specialist. Advised patient to call with any questions or concerns. Patient verbalized understanding.   Pt here today to receive Monkeypox vaccine, subq. Screening form and consent completed on paper and will be scanned into chart.

## 2023-08-22 NOTE — SOCIAL HISTORY
[Sexually Active] : The patient is sexually active [Sometimes] : sometimes [Oral-Receptive (pt. mouth)] : oral-receptive (pt. mouth) [Anal-Receptive (pt anus)] : anal-receptive (pt anus) [To Pt Genitalia] : pt genitalia [To Pt Anus] : pt anus [To Pt Penis] : pt penis [Male ___] : [unfilled] male [Date of most recent sexual encounter ___] : ~His/Her~ most recent sexual encounter was [unfilled] [Partner Aware?] : Partner Aware? Yes [Partnership for Health – Safe Sex Evidence Based Intervention] : The Partnership for Health Safe Sex Evidence Based Intervention was applied [Positive Reinforcement of Safe Behavior Message] : and a positive reinforcement of safe behavior message was provided. [Monogamous] : is not monogamous [de-identified] : Verse

## 2023-08-23 LAB
ALBUMIN SERPL ELPH-MCNC: 4.6 G/DL
ALP BLD-CCNC: 71 U/L
ALT SERPL-CCNC: 20 U/L
ANION GAP SERPL CALC-SCNC: 18 MMOL/L
APPEARANCE: CLEAR
AST SERPL-CCNC: 20 U/L
BILIRUB SERPL-MCNC: 0.4 MG/DL
BILIRUBIN URINE: NEGATIVE
BLOOD URINE: NEGATIVE
BUN SERPL-MCNC: 25 MG/DL
C TRACH RRNA SPEC QL NAA+PROBE: NOT DETECTED
CALCIUM SERPL-MCNC: 9.3 MG/DL
CHLORIDE SERPL-SCNC: 105 MMOL/L
CO2 SERPL-SCNC: 20 MMOL/L
COLOR: YELLOW
CREAT SERPL-MCNC: 1.02 MG/DL
EGFR: 93 ML/MIN/1.73M2
GLUCOSE QUALITATIVE U: NEGATIVE MG/DL
HIV1 RNA # SERPL NAA+PROBE: NORMAL
HIV1 RNA # SERPL NAA+PROBE: NORMAL COPIES/ML
HIV1+2 AB SPEC QL IA.RAPID: NONREACTIVE
KETONES URINE: NEGATIVE MG/DL
LEUKOCYTE ESTERASE URINE: NEGATIVE
N GONORRHOEA RRNA SPEC QL NAA+PROBE: NOT DETECTED
NITRITE URINE: NEGATIVE
PH URINE: 5.5
POTASSIUM SERPL-SCNC: 4.4 MMOL/L
PROT SERPL-MCNC: 7.2 G/DL
PROTEIN URINE: NEGATIVE MG/DL
SODIUM SERPL-SCNC: 142 MMOL/L
SOURCE ANAL: NORMAL
SPECIFIC GRAVITY URINE: >1.03
T PALLIDUM AB SER QL IA: NEGATIVE
UROBILINOGEN URINE: 0.2 MG/DL
VIRAL LOAD INTERP: NORMAL
VIRAL LOAD LOG: NORMAL LG COP/ML

## 2023-08-28 DIAGNOSIS — Z11.3 ENCOUNTER FOR SCREENING FOR INFECTIONS WITH A PREDOMINANTLY SEXUAL MODE OF TRANSMISSION: ICD-10-CM

## 2023-08-28 DIAGNOSIS — Z29.9 ENCOUNTER FOR PROPHYLACTIC MEASURES, UNSPECIFIED: ICD-10-CM

## 2023-09-07 ENCOUNTER — NON-APPOINTMENT (OUTPATIENT)
Age: 43
End: 2023-09-07

## 2023-09-08 LAB — ANAL PAP CYTOLOGY: NORMAL

## 2023-11-13 DIAGNOSIS — Z29.9 ENCOUNTER FOR PROPHYLACTIC MEASURES, UNSPECIFIED: ICD-10-CM

## 2023-11-14 ENCOUNTER — OUTPATIENT (OUTPATIENT)
Dept: OUTPATIENT SERVICES | Facility: HOSPITAL | Age: 43
LOS: 1 days | End: 2023-11-14

## 2023-11-14 DIAGNOSIS — Z00.00 ENCOUNTER FOR GENERAL ADULT MEDICAL EXAMINATION WITHOUT ABNORMAL FINDINGS: ICD-10-CM

## 2023-11-20 ENCOUNTER — OUTPATIENT (OUTPATIENT)
Dept: OUTPATIENT SERVICES | Facility: HOSPITAL | Age: 43
LOS: 1 days | End: 2023-11-20
Payer: MEDICAID

## 2023-11-20 ENCOUNTER — MED ADMIN CHARGE (OUTPATIENT)
Age: 43
End: 2023-11-20

## 2023-11-20 ENCOUNTER — APPOINTMENT (OUTPATIENT)
Dept: PEDIATRIC ALLERGY IMMUNOLOGY | Facility: CLINIC | Age: 43
End: 2023-11-20
Payer: MEDICAID

## 2023-11-20 ENCOUNTER — LABORATORY RESULT (OUTPATIENT)
Age: 43
End: 2023-11-20

## 2023-11-20 VITALS
BODY MASS INDEX: 32.76 KG/M2 | OXYGEN SATURATION: 98 % | HEART RATE: 64 BPM | SYSTOLIC BLOOD PRESSURE: 115 MMHG | TEMPERATURE: 98.6 F | WEIGHT: 234 LBS | HEIGHT: 71 IN | DIASTOLIC BLOOD PRESSURE: 70 MMHG

## 2023-11-20 DIAGNOSIS — Z00.00 ENCOUNTER FOR GENERAL ADULT MEDICAL EXAMINATION WITHOUT ABNORMAL FINDINGS: ICD-10-CM

## 2023-11-20 PROCEDURE — 90471 IMMUNIZATION ADMIN: CPT

## 2023-11-20 PROCEDURE — 86780 TREPONEMA PALLIDUM: CPT

## 2023-11-20 PROCEDURE — 85025 COMPLETE CBC W/AUTO DIFF WBC: CPT

## 2023-11-20 PROCEDURE — 87389 HIV-1 AG W/HIV-1&-2 AB AG IA: CPT

## 2023-11-20 PROCEDURE — ZZZZZ: CPT

## 2023-11-20 PROCEDURE — 90686 IIV4 VACC NO PRSV 0.5 ML IM: CPT

## 2023-11-20 PROCEDURE — 99205 OFFICE O/P NEW HI 60 MIN: CPT

## 2023-11-20 PROCEDURE — 81001 URINALYSIS AUTO W/SCOPE: CPT

## 2023-11-20 PROCEDURE — 90651 9VHPV VACCINE 2/3 DOSE IM: CPT

## 2023-11-20 PROCEDURE — 36415 COLL VENOUS BLD VENIPUNCTURE: CPT

## 2023-11-20 PROCEDURE — G0463: CPT

## 2023-11-20 PROCEDURE — 87591 N.GONORRHOEAE DNA AMP PROB: CPT

## 2023-11-20 PROCEDURE — 87491 CHLMYD TRACH DNA AMP PROBE: CPT

## 2023-11-20 PROCEDURE — 80053 COMPREHEN METABOLIC PANEL: CPT

## 2023-11-20 PROCEDURE — 90472 IMMUNIZATION ADMIN EACH ADD: CPT

## 2023-11-20 PROCEDURE — 87536 HIV-1 QUANT&REVRSE TRNSCRPJ: CPT

## 2023-11-21 LAB
ALBUMIN SERPL ELPH-MCNC: 4.4 G/DL
ALP BLD-CCNC: 70 U/L
ALT SERPL-CCNC: 20 U/L
ANION GAP SERPL CALC-SCNC: 11 MMOL/L
APPEARANCE: ABNORMAL
AST SERPL-CCNC: 19 U/L
BASOPHILS # BLD AUTO: 0.02 K/UL
BASOPHILS NFR BLD AUTO: 0.6 %
BILIRUB SERPL-MCNC: 0.3 MG/DL
BILIRUBIN URINE: NEGATIVE
BLOOD URINE: NEGATIVE
BUN SERPL-MCNC: 20 MG/DL
C TRACH RRNA SPEC QL NAA+PROBE: NOT DETECTED
CALCIUM SERPL-MCNC: 8.9 MG/DL
CHLORIDE SERPL-SCNC: 104 MMOL/L
CO2 SERPL-SCNC: 25 MMOL/L
COLOR: YELLOW
CREAT SERPL-MCNC: 0.95 MG/DL
EGFR: 101 ML/MIN/1.73M2
EOSINOPHIL # BLD AUTO: 0.04 K/UL
EOSINOPHIL NFR BLD AUTO: 1.2 %
GLUCOSE QUALITATIVE U: NEGATIVE MG/DL
GLUCOSE SERPL-MCNC: 102 MG/DL
HCT VFR BLD CALC: 46.7 %
HGB BLD-MCNC: 15.1 G/DL
HIV1 RNA # SERPL NAA+PROBE: NORMAL
HIV1 RNA # SERPL NAA+PROBE: NORMAL COPIES/ML
HIV1+2 AB SPEC QL IA.RAPID: NONREACTIVE
IMM GRANULOCYTES NFR BLD AUTO: 0 %
KETONES URINE: NEGATIVE MG/DL
LEUKOCYTE ESTERASE URINE: NEGATIVE
LYMPHOCYTES # BLD AUTO: 1.27 K/UL
LYMPHOCYTES NFR BLD AUTO: 36.9 %
MAN DIFF?: NORMAL
MCHC RBC-ENTMCNC: 30.2 PG
MCHC RBC-ENTMCNC: 32.3 GM/DL
MCV RBC AUTO: 93.4 FL
MONOCYTES # BLD AUTO: 0.33 K/UL
MONOCYTES NFR BLD AUTO: 9.6 %
N GONORRHOEA RRNA SPEC QL NAA+PROBE: NOT DETECTED
NEUTROPHILS # BLD AUTO: 1.78 K/UL
NEUTROPHILS NFR BLD AUTO: 51.7 %
NITRITE URINE: NEGATIVE
PH URINE: 5.5
PLATELET # BLD AUTO: 227 K/UL
POTASSIUM SERPL-SCNC: 3.8 MMOL/L
PROT SERPL-MCNC: 6.9 G/DL
PROTEIN URINE: NEGATIVE MG/DL
RBC # BLD: 5 M/UL
RBC # FLD: 12.6 %
SODIUM SERPL-SCNC: 140 MMOL/L
SOURCE AMPLIFICATION: NORMAL
SOURCE ANAL: NORMAL
SOURCE ORAL: NORMAL
SPECIFIC GRAVITY URINE: >1.03
T PALLIDUM AB SER QL IA: NEGATIVE
UROBILINOGEN URINE: 1 MG/DL
VIRAL LOAD INTERP: NORMAL
VIRAL LOAD LOG: NORMAL LG COP/ML
WBC # FLD AUTO: 3.44 K/UL

## 2023-11-27 DIAGNOSIS — Z29.9 ENCOUNTER FOR PROPHYLACTIC MEASURES, UNSPECIFIED: ICD-10-CM

## 2023-11-27 DIAGNOSIS — Z11.3 ENCOUNTER FOR SCREENING FOR INFECTIONS WITH A PREDOMINANTLY SEXUAL MODE OF TRANSMISSION: ICD-10-CM

## 2024-02-20 ENCOUNTER — LABORATORY RESULT (OUTPATIENT)
Age: 44
End: 2024-02-20

## 2024-02-20 ENCOUNTER — OUTPATIENT (OUTPATIENT)
Dept: OUTPATIENT SERVICES | Facility: HOSPITAL | Age: 44
LOS: 1 days | End: 2024-02-20
Payer: MEDICAID

## 2024-02-20 ENCOUNTER — APPOINTMENT (OUTPATIENT)
Dept: PEDIATRIC ALLERGY IMMUNOLOGY | Facility: CLINIC | Age: 44
End: 2024-02-20
Payer: COMMERCIAL

## 2024-02-20 VITALS
SYSTOLIC BLOOD PRESSURE: 119 MMHG | BODY MASS INDEX: 29.82 KG/M2 | HEART RATE: 67 BPM | OXYGEN SATURATION: 100 % | DIASTOLIC BLOOD PRESSURE: 78 MMHG | HEIGHT: 71 IN | WEIGHT: 213 LBS

## 2024-02-20 DIAGNOSIS — Z00.00 ENCOUNTER FOR GENERAL ADULT MEDICAL EXAMINATION WITHOUT ABNORMAL FINDINGS: ICD-10-CM

## 2024-02-20 PROCEDURE — G2211 COMPLEX E/M VISIT ADD ON: CPT | Mod: NC,1L

## 2024-02-20 PROCEDURE — G0463: CPT

## 2024-02-20 PROCEDURE — 99214 OFFICE O/P EST MOD 30 MIN: CPT

## 2024-02-20 RX ORDER — DOXYCYCLINE HYCLATE 100 MG/1
100 CAPSULE ORAL
Qty: 30 | Refills: 0 | Status: ACTIVE | COMMUNITY
Start: 2024-02-20 | End: 1900-01-01

## 2024-02-20 NOTE — HISTORY OF PRESENT ILLNESS
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   Sexual Hx: Relationship status: single Partners (tell me about the genders and bodies of partners, any sperm producing partners): Practices (types of sex, monogamous/polyamorous): Protections from STIs (condoms, OCP, LARC, PrEP, etc.): Past Hx of STIs: Pregnancy (Desire or Hx): LMP:  Patient reports >10 sexual partners in the last 3 months. Last sexual encounter was yesterday  No signs/symptoms of acute HIV. No fever, myalgias, throat pain, meningismus. Pt denies any SOB, cough, chest pain, palpitations, N/V/D/C, fever, or chills. No other health concerns today.

## 2024-02-20 NOTE — REASON FOR VISIT
[Routine Follow-Up] : a routine follow-up visit for [Patient] : patient [[employer] ] : provided by [employer]   [Time Spent: ____ minutes] : Total time spent using  services: [unfilled] minutes. The patient's primary language is not English thus required  services. [TWNoteComboBox1] : Omani

## 2024-02-20 NOTE — SOCIAL HISTORY
Refill request received from pharmacy for sertraline. Script was refilled per protocol.  LOV 8/22/2018  Last Lab 6/5/2018   [Sexually Active] : The patient is sexually active [Sometimes] : sometimes [Oral-Receptive (pt. mouth)] : oral-receptive (pt. mouth) [Anal-Receptive (pt anus)] : anal-receptive (pt anus) [To Pt Genitalia] : pt genitalia [To Pt Anus] : pt anus [To Pt Penis] : pt penis [Male ___] : [unfilled] male [Date of most recent sexual encounter ___] : ~His/Her~ most recent sexual encounter was [unfilled] [Partner Aware?] : Partner Aware? Yes [Partnership for Health – Safe Sex Evidence Based Intervention] : The Partnership for Health Safe Sex Evidence Based Intervention was applied [Positive Reinforcement of Safe Behavior Message] : and a positive reinforcement of safe behavior message was provided. [Monogamous] : is not monogamous [de-identified] : Verse

## 2024-02-20 NOTE — ASSESSMENT
[FreeTextEntry1] :   Anal Dysplasia: well controlled, continue care under specialist. Advised patient to call with any questions or concerns. Patient verbalized understanding.   DoxyPEP has been shown to reduce the risk of bacterial STIs (gonorrhea, chlamydia, and syphilis) by 65%. It does not protect against viral infection such as HIV, herpes, and MPox. Educated patient on potential side effects including but not limited to upset stomach, nausea, diarrhea, and photosensitivity. Educated patient that DoxyPEP is not 100% effective and should still seek STI screening at least once every 3 months. Advised patient safer sex practices using barrier methods (condoms) are still recommended.  How to take it: -- Take 200 mg (either two 100 mg pills or one 200 mg pill, depending what you are dispensed) ideally within 24 hours, but no later than 72 hours, after condomless sex. -- Condomless sex means any oral, anal, or vaginal/front-hole sex where a condom was not used the entire time -- If you have sex again within 24 hours of taking DoxyPEP, take one more dose 24 hours after your first (for example, if you took it at 11 AM on Saturday, take it at 11 AM on Sunday). You can take DoxyPEP as often as once every day, but do not take more than one dose (200 mg) every 24 hours. -- Stay upright for 30 minutes after taking it, and do not take it with milk or vitamin D/calcium supplements

## 2024-02-21 DIAGNOSIS — Z11.3 ENCOUNTER FOR SCREENING FOR INFECTIONS WITH A PREDOMINANTLY SEXUAL MODE OF TRANSMISSION: ICD-10-CM

## 2024-02-21 DIAGNOSIS — A74.9 CHLAMYDIAL INFECTION, UNSPECIFIED: ICD-10-CM

## 2024-02-21 DIAGNOSIS — Z29.9 ENCOUNTER FOR PROPHYLACTIC MEASURES, UNSPECIFIED: ICD-10-CM

## 2024-02-21 LAB
ALBUMIN SERPL ELPH-MCNC: 4.5 G/DL
ALP BLD-CCNC: 57 U/L
ALT SERPL-CCNC: 13 U/L
ANION GAP SERPL CALC-SCNC: 9 MMOL/L
APPEARANCE: CLEAR
AST SERPL-CCNC: 16 U/L
BASOPHILS # BLD AUTO: 0.03 K/UL
BASOPHILS NFR BLD AUTO: 0.8 %
BILIRUB SERPL-MCNC: 0.2 MG/DL
BILIRUBIN URINE: NEGATIVE
BLOOD URINE: NEGATIVE
BUN SERPL-MCNC: 22 MG/DL
CALCIUM SERPL-MCNC: 9 MG/DL
CHLORIDE SERPL-SCNC: 107 MMOL/L
CO2 SERPL-SCNC: 27 MMOL/L
COLOR: YELLOW
CREAT SERPL-MCNC: 0.99 MG/DL
EGFR: 96 ML/MIN/1.73M2
EOSINOPHIL # BLD AUTO: 0.03 K/UL
EOSINOPHIL NFR BLD AUTO: 0.8 %
ESTIMATED AVERAGE GLUCOSE: 114 MG/DL
GLUCOSE QUALITATIVE U: NEGATIVE MG/DL
HBA1C MFR BLD HPLC: 5.6 %
HBV CORE IGG+IGM SER QL: NONREACTIVE
HBV SURFACE AB SERPL IA-ACNC: 195.3 MIU/ML
HBV SURFACE AG SER QL: NONREACTIVE
HCT VFR BLD CALC: 42.4 %
HCV AB SER QL: NONREACTIVE
HCV S/CO RATIO: 0.11 S/CO
HEPATITIS A IGG ANTIBODY: REACTIVE
HGB BLD-MCNC: 14.4 G/DL
HIV1 RNA # SERPL NAA+PROBE: NORMAL
HIV1 RNA # SERPL NAA+PROBE: NORMAL COPIES/ML
HIV1+2 AB SPEC QL IA.RAPID: NONREACTIVE
IMM GRANULOCYTES NFR BLD AUTO: 0.3 %
KETONES URINE: NEGATIVE MG/DL
LEUKOCYTE ESTERASE URINE: NEGATIVE
LYMPHOCYTES # BLD AUTO: 1.6 K/UL
LYMPHOCYTES NFR BLD AUTO: 45.3 %
MAN DIFF?: NORMAL
MCHC RBC-ENTMCNC: 31.1 PG
MCHC RBC-ENTMCNC: 34 GM/DL
MCV RBC AUTO: 91.6 FL
MONOCYTES # BLD AUTO: 0.28 K/UL
MONOCYTES NFR BLD AUTO: 7.9 %
NEUTROPHILS # BLD AUTO: 1.58 K/UL
NEUTROPHILS NFR BLD AUTO: 44.9 %
NITRITE URINE: NEGATIVE
PH URINE: 5
PLATELET # BLD AUTO: 221 K/UL
POTASSIUM SERPL-SCNC: 4.4 MMOL/L
PROT SERPL-MCNC: 6.9 G/DL
PROTEIN URINE: NEGATIVE MG/DL
RBC # BLD: 4.63 M/UL
RBC # FLD: 12.6 %
SODIUM SERPL-SCNC: 142 MMOL/L
SPECIFIC GRAVITY URINE: >1.03
T PALLIDUM AB SER QL IA: NEGATIVE
UROBILINOGEN URINE: 0.2 MG/DL
VIRAL LOAD INTERP: NORMAL
VIRAL LOAD LOG: NORMAL LG COP/ML
WBC # FLD AUTO: 3.53 K/UL

## 2024-02-22 ENCOUNTER — NON-APPOINTMENT (OUTPATIENT)
Age: 44
End: 2024-02-22

## 2024-02-22 LAB
C TRACH RRNA SPEC QL NAA+PROBE: DETECTED
C TRACH RRNA SPEC QL NAA+PROBE: DETECTED
C TRACH RRNA SPEC QL NAA+PROBE: NORMAL
N GONORRHOEA RRNA SPEC QL NAA+PROBE: NORMAL
N GONORRHOEA RRNA SPEC QL NAA+PROBE: NOT DETECTED
N GONORRHOEA RRNA SPEC QL NAA+PROBE: NOT DETECTED
SOURCE AMPLIFICATION: NORMAL
SOURCE ANAL: NORMAL
SOURCE ORAL: NORMAL

## 2024-03-07 ENCOUNTER — APPOINTMENT (OUTPATIENT)
Dept: COLORECTAL SURGERY | Facility: CLINIC | Age: 44
End: 2024-03-07

## 2024-03-07 NOTE — HISTORY OF PRESENT ILLNESS
[FreeTextEntry1] : 44 y/o Sierra Leonean speaking M presents for f/u on anal dysplasia.   PMH body skin itches / rashes (Follows with Derm) HIV (-), on Truvada PrEP, MSM, admits to insertive and receptive anal sex (usually insertive).   Gardasil completed 11/20/2023  Patient seen for initial visit 02/28/2022, due to abnormal Anal Pap result done by PCP 02/17/2023 [facility]IL. Exam noted left posterior skin tag, no perianal skin lesions. Recommend high resolution anoscopy, excision and fulguration of perianal lesions.  s/p HRA with excision and fulguration of perianal lesions 3/17/23  Pathology noted  1. Left posterior perianal skin tag, excision: Fibroepithelial polyp, chronically inflamed.  2. Posterior midline anal canal lesion, excision biopsy: Condyloma acuminatum with high-grade dysplasia,  - Brisk lymphoid reaction also present.  3. Right lateral anal canal lesion, excision biopsy:  Condyloma acuminata with focal high-grade dysplasia involving metaplastic mucosa at transformation zone (see note)  4. Anterior midline anal canal lesion, excision biopsy: Condyloma acuminatum with low-grade dysplasia.  5. Right posterior anal canal lesion, biopsy: Condyloma acuminatum with dysplasia, minute fragment.   Seen for post operative evaluation 5/11/23, exam noted well healed surgical site. Mild internal hemorrhoids. No visible lesions in the anal canal.  Last seen 08/10/2023 for f/u.  Exam noted minimal external hemorrhoids without inflammation, internal hemorrhoids without inflammation.  Anal Pap: Low grade squamous intraepithelial lesion (LSIL).  Seen by PCP 02/20/24, recently +Chlamydia of Oral, Urine sample neg Anal, Treated with Doxy.

## 2024-05-21 ENCOUNTER — OUTPATIENT (OUTPATIENT)
Dept: OUTPATIENT SERVICES | Facility: HOSPITAL | Age: 44
LOS: 1 days | End: 2024-05-21
Payer: MEDICAID

## 2024-05-21 ENCOUNTER — APPOINTMENT (OUTPATIENT)
Dept: PEDIATRIC ALLERGY IMMUNOLOGY | Facility: CLINIC | Age: 44
End: 2024-05-21
Payer: COMMERCIAL

## 2024-05-21 VITALS
HEIGHT: 71 IN | SYSTOLIC BLOOD PRESSURE: 101 MMHG | HEART RATE: 80 BPM | WEIGHT: 213 LBS | BODY MASS INDEX: 29.82 KG/M2 | OXYGEN SATURATION: 95 % | DIASTOLIC BLOOD PRESSURE: 67 MMHG

## 2024-05-21 DIAGNOSIS — Z11.3 ENCOUNTER FOR SCREENING FOR INFECTIONS WITH A PREDOMINANTLY SEXUAL MODE OF TRANSMISSION: ICD-10-CM

## 2024-05-21 DIAGNOSIS — Z29.81 ENCOUNTER FOR HIV PRE-EXPOSURE PROPHYLAXIS: ICD-10-CM

## 2024-05-21 DIAGNOSIS — Z11.4 ENCOUNTER FOR SCREENING FOR HUMAN IMMUNODEFICIENCY VIRUS [HIV]: ICD-10-CM

## 2024-05-21 DIAGNOSIS — Z00.00 ENCOUNTER FOR GENERAL ADULT MEDICAL EXAMINATION WITHOUT ABNORMAL FINDINGS: ICD-10-CM

## 2024-05-21 PROCEDURE — 86780 TREPONEMA PALLIDUM: CPT

## 2024-05-21 PROCEDURE — 36415 COLL VENOUS BLD VENIPUNCTURE: CPT

## 2024-05-21 PROCEDURE — 87591 N.GONORRHOEAE DNA AMP PROB: CPT

## 2024-05-21 PROCEDURE — 87491 CHLMYD TRACH DNA AMP PROBE: CPT

## 2024-05-21 PROCEDURE — 80053 COMPREHEN METABOLIC PANEL: CPT

## 2024-05-21 PROCEDURE — 87389 HIV-1 AG W/HIV-1&-2 AB AG IA: CPT

## 2024-05-21 PROCEDURE — G0463: CPT

## 2024-05-21 PROCEDURE — G2211 COMPLEX E/M VISIT ADD ON: CPT | Mod: NC,1L

## 2024-05-21 PROCEDURE — 85025 COMPLETE CBC W/AUTO DIFF WBC: CPT

## 2024-05-21 PROCEDURE — 87536 HIV-1 QUANT&REVRSE TRNSCRPJ: CPT

## 2024-05-21 PROCEDURE — 99214 OFFICE O/P EST MOD 30 MIN: CPT

## 2024-05-21 RX ORDER — DOXYCYCLINE HYCLATE 100 MG/1
100 CAPSULE ORAL
Qty: 14 | Refills: 0 | Status: COMPLETED | COMMUNITY
Start: 2024-02-21 | End: 2024-05-21

## 2024-05-21 RX ORDER — EMTRICITABINE AND TENOFOVIR DISOPROXIL FUMARATE 200; 300 MG/1; MG/1
200-300 TABLET, FILM COATED ORAL
Qty: 30 | Refills: 2 | Status: ACTIVE | COMMUNITY
Start: 2021-09-29 | End: 1900-01-01

## 2024-05-21 NOTE — REASON FOR VISIT
[Routine Follow-Up] : a routine follow-up visit for [Patient] : patient [[employer] ] : provided by [employer]   [TWNoteComboBox1] : Slovak

## 2024-05-21 NOTE — HISTORY OF PRESENT ILLNESS
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   Sexual Hx: Relationship status: single Partners (tell me about the genders and bodies of partners, any sperm producing partners): cis men Practices (types of sex, monogamous/polyamorous): oral, anal  Protections from STIs (condoms, OCP, LARC, PrEP, etc.): PrEP, doxyPEP, sometimes using condoms Past Hx of STIs: ct 2/24 Pregnancy (Desire or Hx): n/a LMP: n/a  Patient reports >10 sexual partners in the last 3 months. Last sexual encounter was yesterday  No signs/symptoms of acute HIV. No fever, myalgias, throat pain, meningismus. Pt denies any SOB, cough, chest pain, palpitations, N/V/D/C, fever, or chills. No other health concerns today.

## 2024-05-21 NOTE — SOCIAL HISTORY
[Sexually Active] : The patient is sexually active [Sometimes] : sometimes [Oral-Receptive (pt. mouth)] : oral-receptive (pt. mouth) [Anal-Receptive (pt anus)] : anal-receptive (pt anus) [To Pt Genitalia] : pt genitalia [To Pt Anus] : pt anus [To Pt Penis] : pt penis [Male ___] : [unfilled] male [Date of most recent sexual encounter ___] : ~His/Her~ most recent sexual encounter was [unfilled] [Partner Aware?] : Partner Aware? Yes [Partnership for Health – Safe Sex Evidence Based Intervention] : The Partnership for Health Safe Sex Evidence Based Intervention was applied [Positive Reinforcement of Safe Behavior Message] : and a positive reinforcement of safe behavior message was provided. [Monogamous] : is not monogamous [de-identified] : Verse

## 2024-05-22 DIAGNOSIS — Z29.9 ENCOUNTER FOR PROPHYLACTIC MEASURES, UNSPECIFIED: ICD-10-CM

## 2024-05-22 DIAGNOSIS — Z11.3 ENCOUNTER FOR SCREENING FOR INFECTIONS WITH A PREDOMINANTLY SEXUAL MODE OF TRANSMISSION: ICD-10-CM

## 2024-05-22 LAB
ALBUMIN SERPL ELPH-MCNC: 4.5 G/DL
ALP BLD-CCNC: 67 U/L
ALT SERPL-CCNC: 13 U/L
ANION GAP SERPL CALC-SCNC: 16 MMOL/L
APPEARANCE: CLEAR
AST SERPL-CCNC: 21 U/L
BASOPHILS # BLD AUTO: 0.02 K/UL
BASOPHILS NFR BLD AUTO: 0.4 %
BILIRUB SERPL-MCNC: 0.3 MG/DL
BILIRUBIN URINE: NEGATIVE
BLOOD URINE: NEGATIVE
BUN SERPL-MCNC: 23 MG/DL
C TRACH RRNA SPEC QL NAA+PROBE: NOT DETECTED
CALCIUM SERPL-MCNC: 9.4 MG/DL
CHLORIDE SERPL-SCNC: 102 MMOL/L
CO2 SERPL-SCNC: 22 MMOL/L
COLOR: YELLOW
CREAT SERPL-MCNC: 0.96 MG/DL
EGFR: 99 ML/MIN/1.73M2
EOSINOPHIL # BLD AUTO: 0.01 K/UL
EOSINOPHIL NFR BLD AUTO: 0.2 %
GLUCOSE QUALITATIVE U: NEGATIVE MG/DL
HCT VFR BLD CALC: 42 %
HGB BLD-MCNC: 14.2 G/DL
HIV1 RNA # SERPL NAA+PROBE: NORMAL
HIV1 RNA # SERPL NAA+PROBE: NORMAL COPIES/ML
HIV1+2 AB SPEC QL IA.RAPID: NONREACTIVE
IMM GRANULOCYTES NFR BLD AUTO: 0.2 %
KETONES URINE: NEGATIVE MG/DL
LEUKOCYTE ESTERASE URINE: NEGATIVE
LYMPHOCYTES # BLD AUTO: 1.14 K/UL
LYMPHOCYTES NFR BLD AUTO: 24.6 %
MAN DIFF?: NORMAL
MCHC RBC-ENTMCNC: 31.6 PG
MCHC RBC-ENTMCNC: 33.8 GM/DL
MCV RBC AUTO: 93.3 FL
MONOCYTES # BLD AUTO: 0.3 K/UL
MONOCYTES NFR BLD AUTO: 6.5 %
N GONORRHOEA RRNA SPEC QL NAA+PROBE: NOT DETECTED
NEUTROPHILS # BLD AUTO: 3.15 K/UL
NEUTROPHILS NFR BLD AUTO: 68.1 %
NITRITE URINE: NEGATIVE
PH URINE: 7
PLATELET # BLD AUTO: 251 K/UL
POTASSIUM SERPL-SCNC: 4.7 MMOL/L
PROT SERPL-MCNC: 7.2 G/DL
PROTEIN URINE: NEGATIVE MG/DL
RBC # BLD: 4.5 M/UL
RBC # FLD: 12.4 %
SODIUM SERPL-SCNC: 140 MMOL/L
SOURCE AMPLIFICATION: NORMAL
SPECIFIC GRAVITY URINE: 1.02
T PALLIDUM AB SER QL IA: NEGATIVE
UROBILINOGEN URINE: 0.2 MG/DL
VIRAL LOAD INTERP: NORMAL
VIRAL LOAD LOG: NORMAL LG COP/ML
WBC # FLD AUTO: 4.63 K/UL

## 2024-05-23 LAB
C TRACH RRNA SPEC QL NAA+PROBE: NOT DETECTED
C TRACH RRNA SPEC QL NAA+PROBE: NOT DETECTED
N GONORRHOEA RRNA SPEC QL NAA+PROBE: NOT DETECTED
N GONORRHOEA RRNA SPEC QL NAA+PROBE: NOT DETECTED
SOURCE ANAL: NORMAL
SOURCE ORAL: NORMAL

## 2024-06-04 NOTE — PRE-ANESTHESIA EVALUATION ADULT - NSANTHOSAYNRD_GEN_A_CORE
Patient scheduled in [facility] at 9am.   
No. DANICA screening performed.  STOP BANG Legend: 0-2 = LOW Risk; 3-4 = INTERMEDIATE Risk; 5-8 = HIGH Risk

## 2024-08-27 ENCOUNTER — OUTPATIENT (OUTPATIENT)
Dept: OUTPATIENT SERVICES | Facility: HOSPITAL | Age: 44
LOS: 1 days | End: 2024-08-27
Payer: COMMERCIAL

## 2024-08-27 ENCOUNTER — APPOINTMENT (OUTPATIENT)
Dept: PEDIATRIC ALLERGY IMMUNOLOGY | Facility: CLINIC | Age: 44
End: 2024-08-27
Payer: COMMERCIAL

## 2024-08-27 DIAGNOSIS — Z00.00 ENCOUNTER FOR GENERAL ADULT MEDICAL EXAMINATION WITHOUT ABNORMAL FINDINGS: ICD-10-CM

## 2024-08-27 DIAGNOSIS — F32.A DEPRESSION, UNSPECIFIED: ICD-10-CM

## 2024-08-27 LAB
ALBUMIN SERPL ELPH-MCNC: 4.5 G/DL
ALP BLD-CCNC: 73 U/L
ALT SERPL-CCNC: 16 U/L
ANION GAP SERPL CALC-SCNC: 9 MMOL/L
AST SERPL-CCNC: 17 U/L
BASOPHILS # BLD AUTO: 0.03 K/UL
BASOPHILS NFR BLD AUTO: 0.7 %
BILIRUB SERPL-MCNC: 0.3 MG/DL
BUN SERPL-MCNC: 26 MG/DL
CALCIUM SERPL-MCNC: 9.3 MG/DL
CHLORIDE SERPL-SCNC: 105 MMOL/L
CO2 SERPL-SCNC: 26 MMOL/L
CREAT SERPL-MCNC: 1 MG/DL
EGFR: 95 ML/MIN/1.73M2
EOSINOPHIL # BLD AUTO: 0.07 K/UL
EOSINOPHIL NFR BLD AUTO: 1.5 %
HCT VFR BLD CALC: 44.1 %
HGB BLD-MCNC: 14.3 G/DL
IMM GRANULOCYTES NFR BLD AUTO: 0.2 %
LYMPHOCYTES # BLD AUTO: 1.75 K/UL
LYMPHOCYTES NFR BLD AUTO: 38.6 %
MAN DIFF?: NORMAL
MCHC RBC-ENTMCNC: 30.1 PG
MCHC RBC-ENTMCNC: 32.4 GM/DL
MCV RBC AUTO: 92.8 FL
MONOCYTES # BLD AUTO: 0.39 K/UL
MONOCYTES NFR BLD AUTO: 8.6 %
NEUTROPHILS # BLD AUTO: 2.28 K/UL
NEUTROPHILS NFR BLD AUTO: 50.4 %
PLATELET # BLD AUTO: 253 K/UL
POTASSIUM SERPL-SCNC: 4.4 MMOL/L
PROT SERPL-MCNC: 7 G/DL
RBC # BLD: 4.75 M/UL
RBC # FLD: 12.4 %
SODIUM SERPL-SCNC: 140 MMOL/L
WBC # FLD AUTO: 4.53 K/UL

## 2024-08-27 PROCEDURE — 86780 TREPONEMA PALLIDUM: CPT

## 2024-08-27 PROCEDURE — 87536 HIV-1 QUANT&REVRSE TRNSCRPJ: CPT

## 2024-08-27 PROCEDURE — 87491 CHLMYD TRACH DNA AMP PROBE: CPT

## 2024-08-27 PROCEDURE — 99214 OFFICE O/P EST MOD 30 MIN: CPT

## 2024-08-27 PROCEDURE — G0463: CPT

## 2024-08-27 PROCEDURE — 87389 HIV-1 AG W/HIV-1&-2 AB AG IA: CPT

## 2024-08-27 PROCEDURE — 36415 COLL VENOUS BLD VENIPUNCTURE: CPT

## 2024-08-27 PROCEDURE — 85025 COMPLETE CBC W/AUTO DIFF WBC: CPT

## 2024-08-27 PROCEDURE — 80053 COMPREHEN METABOLIC PANEL: CPT

## 2024-08-27 PROCEDURE — 87591 N.GONORRHOEAE DNA AMP PROB: CPT

## 2024-08-27 NOTE — ASSESSMENT
[FreeTextEntry1] :  Anal Dysplasia: well controlled, continue care under specialist. Advised patient to call with any questions or concerns. Patient verbalized understanding.   DoxyPEP has been shown to reduce the risk of bacterial STIs (gonorrhea, chlamydia, and syphilis) by 65%. It does not protect against viral infection such as HIV, herpes, and MPox. Educated patient on potential side effects including but not limited to upset stomach, nausea, diarrhea, and photosensitivity. Educated patient that DoxyPEP is not 100% effective and should still seek STI screening at least once every 3 months. Advised patient safer sex practices using barrier methods (condoms) are still recommended.  How to take it: -- Take 200 mg (either two 100 mg pills or one 200 mg pill, depending what you are dispensed) ideally within 24 hours, but no later than 72 hours, after condomless sex. -- Condomless sex means any oral, anal, or vaginal/front-hole sex where a condom was not used the entire time -- If you have sex again within 24 hours of taking DoxyPEP, take one more dose 24 hours after your first (for example, if you took it at 11 AM on Saturday, take it at 11 AM on Sunday). You can take DoxyPEP as often as once every day, but do not take more than one dose (200 mg) every 24 hours. -- Stay upright for 30 minutes after taking it, and do not take it with milk or vitamin D/calcium supplements   Depression: stable, patient denies any thoughts of hurting self or anyone else at time of visit. Continue care under therapist. Patient provided with mental health referrals including crisis resources at time of visit. Advised patient to call with any questions or concerns. Patient verbalized understanding.

## 2024-08-27 NOTE — SOCIAL HISTORY
[Sexually Active] : The patient is sexually active [Sometimes] : sometimes [Oral-Receptive (pt. mouth)] : oral-receptive (pt. mouth) [Anal-Receptive (pt anus)] : anal-receptive (pt anus) [To Pt Genitalia] : pt genitalia [To Pt Anus] : pt anus [To Pt Penis] : pt penis [Male ___] : [unfilled] male [Date of most recent sexual encounter ___] : ~His/Her~ most recent sexual encounter was [unfilled] [Partner Aware?] : Partner Aware? Yes [Partnership for Health – Safe Sex Evidence Based Intervention] : The Partnership for Health Safe Sex Evidence Based Intervention was applied [Positive Reinforcement of Safe Behavior Message] : and a positive reinforcement of safe behavior message was provided. [Monogamous] : is not monogamous [de-identified] : Verse

## 2024-08-27 NOTE — HISTORY OF PRESENT ILLNESS
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   MH: patient following with a therapist, he denies any thoughts of hurting self or anyone else  Sexual Hx: Relationship status: single Partners (tell me about the genders and bodies of partners, any sperm producing partners): cis men Practices (types of sex, monogamous/polyamorous): oral, anal  Protections from STIs (condoms, OCP, LARC, PrEP, etc.): PrEP, doxyPEP, sometimes using condoms Past Hx of STIs: ct 2/24 Pregnancy (Desire or Hx): n/a LMP: n/a  Patient reports >10 sexual partners in the last 3 months. Last sexual encounter was last night  No signs/symptoms of acute HIV. No fever, myalgias, throat pain, meningismus. Pt denies any SOB, cough, chest pain, palpitations, N/V/D/C, fever, or chills. No other health concerns today.

## 2024-08-27 NOTE — REASON FOR VISIT
[Routine Follow-Up] : a routine follow-up visit for [Patient] : patient [[employer] ] : provided by [employer]   [TWNoteComboBox1] : Cambodian

## 2024-08-28 LAB
APPEARANCE: CLEAR
BILIRUBIN URINE: NEGATIVE
BLOOD URINE: NEGATIVE
C TRACH RRNA SPEC QL NAA+PROBE: NOT DETECTED
C TRACH RRNA SPEC QL NAA+PROBE: NOT DETECTED
COLOR: YELLOW
GLUCOSE QUALITATIVE U: NEGATIVE MG/DL
HIV1 RNA # SERPL NAA+PROBE: NORMAL
HIV1 RNA # SERPL NAA+PROBE: NORMAL COPIES/ML
HIV1+2 AB SPEC QL IA.RAPID: NONREACTIVE
KETONES URINE: NEGATIVE MG/DL
LEUKOCYTE ESTERASE URINE: NEGATIVE
N GONORRHOEA RRNA SPEC QL NAA+PROBE: NOT DETECTED
N GONORRHOEA RRNA SPEC QL NAA+PROBE: NOT DETECTED
NITRITE URINE: NEGATIVE
PH URINE: 6.5
PROTEIN URINE: NEGATIVE MG/DL
SOURCE AMPLIFICATION: NORMAL
SOURCE ANAL: NORMAL
SPECIFIC GRAVITY URINE: 1.03
T PALLIDUM AB SER QL IA: NEGATIVE
UROBILINOGEN URINE: 0.2 MG/DL
VIRAL LOAD INTERP: NORMAL
VIRAL LOAD LOG: NORMAL LG COP/ML

## 2024-08-29 DIAGNOSIS — Z29.9 ENCOUNTER FOR PROPHYLACTIC MEASURES, UNSPECIFIED: ICD-10-CM

## 2024-08-29 DIAGNOSIS — Z11.3 ENCOUNTER FOR SCREENING FOR INFECTIONS WITH A PREDOMINANTLY SEXUAL MODE OF TRANSMISSION: ICD-10-CM

## 2024-09-04 ENCOUNTER — OUTPATIENT (OUTPATIENT)
Dept: OUTPATIENT SERVICES | Facility: HOSPITAL | Age: 44
LOS: 1 days | End: 2024-09-04

## 2024-09-04 ENCOUNTER — APPOINTMENT (OUTPATIENT)
Dept: PEDIATRIC ALLERGY IMMUNOLOGY | Facility: CLINIC | Age: 44
End: 2024-09-04

## 2024-09-04 DIAGNOSIS — Z00.00 ENCOUNTER FOR GENERAL ADULT MEDICAL EXAMINATION WITHOUT ABNORMAL FINDINGS: ICD-10-CM

## 2024-09-04 PROCEDURE — ZZZZZ: CPT

## 2024-09-04 RX ORDER — CEFTRIAXONE 500 MG/1
500 INJECTION, POWDER, FOR SOLUTION INTRAMUSCULAR; INTRAVENOUS
Qty: 1 | Refills: 0 | Status: COMPLETED | OUTPATIENT
Start: 2024-09-04

## 2024-09-04 RX ADMIN — CEFTRIAXONE 1 MG: 500 INJECTION, POWDER, FOR SOLUTION INTRAMUSCULAR; INTRAVENOUS at 00:00

## 2024-11-19 ENCOUNTER — LABORATORY RESULT (OUTPATIENT)
Age: 44
End: 2024-11-19

## 2024-11-19 ENCOUNTER — OUTPATIENT (OUTPATIENT)
Dept: OUTPATIENT SERVICES | Facility: HOSPITAL | Age: 44
LOS: 1 days | End: 2024-11-19
Payer: COMMERCIAL

## 2024-11-19 ENCOUNTER — APPOINTMENT (OUTPATIENT)
Dept: PEDIATRIC ALLERGY IMMUNOLOGY | Facility: CLINIC | Age: 44
End: 2024-11-19
Payer: COMMERCIAL

## 2024-11-19 VITALS
OXYGEN SATURATION: 95 % | WEIGHT: 220 LBS | HEIGHT: 71 IN | TEMPERATURE: 98.1 F | BODY MASS INDEX: 30.8 KG/M2 | SYSTOLIC BLOOD PRESSURE: 117 MMHG | DIASTOLIC BLOOD PRESSURE: 69 MMHG

## 2024-11-19 DIAGNOSIS — Z00.00 ENCOUNTER FOR GENERAL ADULT MEDICAL EXAMINATION WITHOUT ABNORMAL FINDINGS: ICD-10-CM

## 2024-11-19 DIAGNOSIS — Z11.3 ENCOUNTER FOR SCREENING FOR INFECTIONS WITH A PREDOMINANTLY SEXUAL MODE OF TRANSMISSION: ICD-10-CM

## 2024-11-19 DIAGNOSIS — B36.0 PITYRIASIS VERSICOLOR: ICD-10-CM

## 2024-11-19 DIAGNOSIS — Z11.4 ENCOUNTER FOR SCREENING FOR HUMAN IMMUNODEFICIENCY VIRUS [HIV]: ICD-10-CM

## 2024-11-19 DIAGNOSIS — Z29.81 ENCOUNTER FOR HIV PRE-EXPOSURE PROPHYLAXIS: ICD-10-CM

## 2024-11-19 PROCEDURE — 82565 ASSAY OF CREATININE: CPT

## 2024-11-19 PROCEDURE — 84155 ASSAY OF PROTEIN SERUM: CPT

## 2024-11-19 PROCEDURE — G0463: CPT

## 2024-11-19 PROCEDURE — 87491 CHLMYD TRACH DNA AMP PROBE: CPT

## 2024-11-19 PROCEDURE — 84075 ASSAY ALKALINE PHOSPHATASE: CPT

## 2024-11-19 PROCEDURE — G2211 COMPLEX E/M VISIT ADD ON: CPT | Mod: NC

## 2024-11-19 PROCEDURE — 84295 ASSAY OF SERUM SODIUM: CPT

## 2024-11-19 PROCEDURE — 82040 ASSAY OF SERUM ALBUMIN: CPT

## 2024-11-19 PROCEDURE — 82374 ASSAY BLOOD CARBON DIOXIDE: CPT

## 2024-11-19 PROCEDURE — 87536 HIV-1 QUANT&REVRSE TRNSCRPJ: CPT

## 2024-11-19 PROCEDURE — 84450 TRANSFERASE (AST) (SGOT): CPT

## 2024-11-19 PROCEDURE — 81001 URINALYSIS AUTO W/SCOPE: CPT

## 2024-11-19 PROCEDURE — 99214 OFFICE O/P EST MOD 30 MIN: CPT

## 2024-11-19 PROCEDURE — 87389 HIV-1 AG W/HIV-1&-2 AB AG IA: CPT

## 2024-11-19 PROCEDURE — 82247 BILIRUBIN TOTAL: CPT

## 2024-11-19 PROCEDURE — 84132 ASSAY OF SERUM POTASSIUM: CPT

## 2024-11-19 PROCEDURE — 82310 ASSAY OF CALCIUM: CPT

## 2024-11-19 PROCEDURE — 82435 ASSAY OF BLOOD CHLORIDE: CPT

## 2024-11-19 PROCEDURE — 85025 COMPLETE CBC W/AUTO DIFF WBC: CPT

## 2024-11-19 PROCEDURE — 87591 N.GONORRHOEAE DNA AMP PROB: CPT

## 2024-11-19 PROCEDURE — 84460 ALANINE AMINO (ALT) (SGPT): CPT

## 2024-11-19 PROCEDURE — 84520 ASSAY OF UREA NITROGEN: CPT

## 2024-11-19 PROCEDURE — 86780 TREPONEMA PALLIDUM: CPT

## 2024-11-20 LAB
ALBUMIN SERPL ELPH-MCNC: 4.4 G/DL
ALP BLD-CCNC: 62 U/L
ALT SERPL-CCNC: 15 U/L
ANION GAP SERPL CALC-SCNC: 11 MMOL/L
APPEARANCE: ABNORMAL
AST SERPL-CCNC: 20 U/L
BASOPHILS # BLD AUTO: 0.02 K/UL
BASOPHILS NFR BLD AUTO: 0.5 %
BILIRUB SERPL-MCNC: 0.4 MG/DL
BILIRUBIN URINE: NEGATIVE
BLOOD URINE: NEGATIVE
BUN SERPL-MCNC: 26 MG/DL
C TRACH RRNA SPEC QL NAA+PROBE: NOT DETECTED
CALCIUM SERPL-MCNC: 9.4 MG/DL
CHLORIDE SERPL-SCNC: 104 MMOL/L
CO2 SERPL-SCNC: 25 MMOL/L
COLOR: YELLOW
CREAT SERPL-MCNC: 1.04 MG/DL
EGFR: 90 ML/MIN/1.73M2
EOSINOPHIL # BLD AUTO: 0.05 K/UL
EOSINOPHIL NFR BLD AUTO: 1.4 %
GLUCOSE QUALITATIVE U: NEGATIVE MG/DL
HCT VFR BLD CALC: 47.2 %
HGB BLD-MCNC: 15.3 G/DL
HIV1 RNA # SERPL NAA+PROBE: NORMAL
HIV1 RNA # SERPL NAA+PROBE: NORMAL COPIES/ML
HIV1+2 AB SPEC QL IA.RAPID: NONREACTIVE
IMM GRANULOCYTES NFR BLD AUTO: 0.3 %
KETONES URINE: NEGATIVE MG/DL
LEUKOCYTE ESTERASE URINE: NEGATIVE
LYMPHOCYTES # BLD AUTO: 1.58 K/UL
LYMPHOCYTES NFR BLD AUTO: 42.7 %
MAN DIFF?: NORMAL
MCHC RBC-ENTMCNC: 31.1 PG
MCHC RBC-ENTMCNC: 32.4 G/DL
MCV RBC AUTO: 95.9 FL
MONOCYTES # BLD AUTO: 0.31 K/UL
MONOCYTES NFR BLD AUTO: 8.4 %
N GONORRHOEA RRNA SPEC QL NAA+PROBE: NOT DETECTED
NEUTROPHILS # BLD AUTO: 1.73 K/UL
NEUTROPHILS NFR BLD AUTO: 46.7 %
NITRITE URINE: NEGATIVE
PH URINE: 5.5
PLATELET # BLD AUTO: 244 K/UL
POTASSIUM SERPL-SCNC: 4.4 MMOL/L
PROT SERPL-MCNC: 7 G/DL
PROTEIN URINE: NORMAL MG/DL
RBC # BLD: 4.92 M/UL
RBC # FLD: 12.2 %
SODIUM SERPL-SCNC: 140 MMOL/L
SOURCE ORAL: NORMAL
SPECIFIC GRAVITY URINE: 1.03
T PALLIDUM AB SER QL IA: NEGATIVE
UROBILINOGEN URINE: 1 MG/DL
VIRAL LOAD INTERP: NORMAL
VIRAL LOAD LOG: NORMAL LG COP/ML
WBC # FLD AUTO: 3.7 K/UL

## 2024-11-21 LAB
C TRACH RRNA SPEC QL NAA+PROBE: NOT DETECTED
N GONORRHOEA RRNA SPEC QL NAA+PROBE: NOT DETECTED
SOURCE ANAL: NORMAL

## 2024-11-22 ENCOUNTER — OUTPATIENT (OUTPATIENT)
Dept: OUTPATIENT SERVICES | Facility: HOSPITAL | Age: 44
LOS: 1 days | End: 2024-11-22
Payer: COMMERCIAL

## 2024-11-22 ENCOUNTER — APPOINTMENT (OUTPATIENT)
Dept: PEDIATRIC ALLERGY IMMUNOLOGY | Facility: CLINIC | Age: 44
End: 2024-11-22
Payer: COMMERCIAL

## 2024-11-22 DIAGNOSIS — Z00.00 ENCOUNTER FOR GENERAL ADULT MEDICAL EXAMINATION WITHOUT ABNORMAL FINDINGS: ICD-10-CM

## 2024-11-22 DIAGNOSIS — Z11.3 ENCOUNTER FOR SCREENING FOR INFECTIONS WITH A PREDOMINANTLY SEXUAL MODE OF TRANSMISSION: ICD-10-CM

## 2024-11-22 DIAGNOSIS — Z29.9 ENCOUNTER FOR PROPHYLACTIC MEASURES, UNSPECIFIED: ICD-10-CM

## 2024-11-22 PROCEDURE — 96372 THER/PROPH/DIAG INJ SC/IM: CPT

## 2024-11-22 PROCEDURE — G0463: CPT

## 2024-11-22 PROCEDURE — 99212 OFFICE O/P EST SF 10 MIN: CPT

## 2024-11-22 RX ORDER — CEFTRIAXONE 500 MG/1
500 INJECTION, POWDER, FOR SOLUTION INTRAMUSCULAR; INTRAVENOUS
Qty: 1 | Refills: 0 | Status: COMPLETED | OUTPATIENT
Start: 2024-11-22

## 2024-11-25 DIAGNOSIS — A54.9 GONOCOCCAL INFECTION, UNSPECIFIED: ICD-10-CM

## 2024-11-27 LAB
C TRACH RRNA SPEC QL NAA+PROBE: NOT DETECTED
N GONORRHOEA RRNA SPEC QL NAA+PROBE: DETECTED
SOURCE AMPLIFICATION: NORMAL

## 2025-02-11 ENCOUNTER — APPOINTMENT (OUTPATIENT)
Dept: PEDIATRIC ALLERGY IMMUNOLOGY | Facility: CLINIC | Age: 45
End: 2025-02-11
Payer: COMMERCIAL

## 2025-02-11 ENCOUNTER — LABORATORY RESULT (OUTPATIENT)
Age: 45
End: 2025-02-11

## 2025-02-11 ENCOUNTER — OUTPATIENT (OUTPATIENT)
Dept: OUTPATIENT SERVICES | Facility: HOSPITAL | Age: 45
LOS: 1 days | End: 2025-02-11
Payer: COMMERCIAL

## 2025-02-11 VITALS
HEART RATE: 71 BPM | HEIGHT: 71 IN | BODY MASS INDEX: 31.92 KG/M2 | DIASTOLIC BLOOD PRESSURE: 76 MMHG | OXYGEN SATURATION: 97 % | WEIGHT: 228 LBS | SYSTOLIC BLOOD PRESSURE: 121 MMHG | TEMPERATURE: 97.2 F

## 2025-02-11 DIAGNOSIS — Z00.00 ENCOUNTER FOR GENERAL ADULT MEDICAL EXAMINATION WITHOUT ABNORMAL FINDINGS: ICD-10-CM

## 2025-02-11 DIAGNOSIS — Z11.4 ENCOUNTER FOR SCREENING FOR HUMAN IMMUNODEFICIENCY VIRUS [HIV]: ICD-10-CM

## 2025-02-11 DIAGNOSIS — Z29.81 ENCOUNTER FOR HIV PRE-EXPOSURE PROPHYLAXIS: ICD-10-CM

## 2025-02-11 DIAGNOSIS — Z11.3 ENCOUNTER FOR SCREENING FOR INFECTIONS WITH A PREDOMINANTLY SEXUAL MODE OF TRANSMISSION: ICD-10-CM

## 2025-02-11 PROCEDURE — 86780 TREPONEMA PALLIDUM: CPT

## 2025-02-11 PROCEDURE — 87591 N.GONORRHOEAE DNA AMP PROB: CPT

## 2025-02-11 PROCEDURE — 86709 HEPATITIS A IGM ANTIBODY: CPT

## 2025-02-11 PROCEDURE — 87340 HEPATITIS B SURFACE AG IA: CPT

## 2025-02-11 PROCEDURE — 82247 BILIRUBIN TOTAL: CPT

## 2025-02-11 PROCEDURE — 82565 ASSAY OF CREATININE: CPT

## 2025-02-11 PROCEDURE — 82310 ASSAY OF CALCIUM: CPT

## 2025-02-11 PROCEDURE — 84450 TRANSFERASE (AST) (SGOT): CPT

## 2025-02-11 PROCEDURE — G2211 COMPLEX E/M VISIT ADD ON: CPT | Mod: NC

## 2025-02-11 PROCEDURE — 99214 OFFICE O/P EST MOD 30 MIN: CPT

## 2025-02-11 PROCEDURE — 86704 HEP B CORE ANTIBODY TOTAL: CPT

## 2025-02-11 PROCEDURE — 87536 HIV-1 QUANT&REVRSE TRNSCRPJ: CPT

## 2025-02-11 PROCEDURE — 86708 HEPATITIS A ANTIBODY: CPT

## 2025-02-11 PROCEDURE — 82374 ASSAY BLOOD CARBON DIOXIDE: CPT

## 2025-02-11 PROCEDURE — 87389 HIV-1 AG W/HIV-1&-2 AB AG IA: CPT

## 2025-02-11 PROCEDURE — 86803 HEPATITIS C AB TEST: CPT

## 2025-02-11 PROCEDURE — G0463: CPT

## 2025-02-11 PROCEDURE — 81003 URINALYSIS AUTO W/O SCOPE: CPT

## 2025-02-11 PROCEDURE — 87491 CHLMYD TRACH DNA AMP PROBE: CPT

## 2025-02-11 PROCEDURE — 84520 ASSAY OF UREA NITROGEN: CPT

## 2025-02-11 PROCEDURE — 82040 ASSAY OF SERUM ALBUMIN: CPT

## 2025-02-11 PROCEDURE — 86706 HEP B SURFACE ANTIBODY: CPT

## 2025-02-11 PROCEDURE — 84075 ASSAY ALKALINE PHOSPHATASE: CPT

## 2025-02-11 PROCEDURE — 84155 ASSAY OF PROTEIN SERUM: CPT

## 2025-02-11 PROCEDURE — 84460 ALANINE AMINO (ALT) (SGPT): CPT

## 2025-02-11 PROCEDURE — 85025 COMPLETE CBC W/AUTO DIFF WBC: CPT

## 2025-02-11 PROCEDURE — 82435 ASSAY OF BLOOD CHLORIDE: CPT

## 2025-02-11 PROCEDURE — 84295 ASSAY OF SERUM SODIUM: CPT

## 2025-02-11 PROCEDURE — 84132 ASSAY OF SERUM POTASSIUM: CPT

## 2025-02-12 LAB
ALBUMIN SERPL ELPH-MCNC: 4.4 G/DL
ALP BLD-CCNC: 58 U/L
ALT SERPL-CCNC: 18 U/L
ANION GAP SERPL CALC-SCNC: 12 MMOL/L
APPEARANCE: CLEAR
AST SERPL-CCNC: 21 U/L
BASOPHILS # BLD AUTO: 0.04 K/UL
BASOPHILS NFR BLD AUTO: 1.1 %
BILIRUB SERPL-MCNC: 0.3 MG/DL
BILIRUBIN URINE: NEGATIVE
BLOOD URINE: NEGATIVE
BUN SERPL-MCNC: 23 MG/DL
C TRACH RRNA SPEC QL NAA+PROBE: NOT DETECTED
CALCIUM SERPL-MCNC: 9.3 MG/DL
CHLORIDE SERPL-SCNC: 105 MMOL/L
CO2 SERPL-SCNC: 23 MMOL/L
COLOR: YELLOW
CREAT SERPL-MCNC: 1.32 MG/DL
EGFR: 67 ML/MIN/1.73M2
EOSINOPHIL # BLD AUTO: 0.06 K/UL
EOSINOPHIL NFR BLD AUTO: 1.7 %
GLUCOSE QUALITATIVE U: NEGATIVE MG/DL
HBV CORE IGG+IGM SER QL: NONREACTIVE
HBV SURFACE AB SERPL IA-ACNC: 173.6 MIU/ML
HBV SURFACE AG SER QL: NONREACTIVE
HCT VFR BLD CALC: 44.3 %
HCV AB SER QL: NONREACTIVE
HCV S/CO RATIO: 0.09 S/CO
HEPATITIS A IGG ANTIBODY: REACTIVE
HGB BLD-MCNC: 14.9 G/DL
HIV1 RNA # SERPL NAA+PROBE: NORMAL
HIV1 RNA # SERPL NAA+PROBE: NORMAL COPIES/ML
HIV1+2 AB SPEC QL IA.RAPID: NONREACTIVE
IMM GRANULOCYTES NFR BLD AUTO: 0 %
KETONES URINE: NEGATIVE MG/DL
LEUKOCYTE ESTERASE URINE: NEGATIVE
LYMPHOCYTES # BLD AUTO: 1.42 K/UL
LYMPHOCYTES NFR BLD AUTO: 39.6 %
MAN DIFF?: NORMAL
MCHC RBC-ENTMCNC: 31.2 PG
MCHC RBC-ENTMCNC: 33.6 G/DL
MCV RBC AUTO: 92.9 FL
MONOCYTES # BLD AUTO: 0.31 K/UL
MONOCYTES NFR BLD AUTO: 8.6 %
N GONORRHOEA RRNA SPEC QL NAA+PROBE: NOT DETECTED
NEUTROPHILS # BLD AUTO: 1.76 K/UL
NEUTROPHILS NFR BLD AUTO: 49 %
NITRITE URINE: NEGATIVE
PH URINE: 6.5
PLATELET # BLD AUTO: 236 K/UL
POTASSIUM SERPL-SCNC: 4.5 MMOL/L
PROT SERPL-MCNC: 6.9 G/DL
PROTEIN URINE: NEGATIVE MG/DL
RBC # BLD: 4.77 M/UL
RBC # FLD: 12.4 %
SODIUM SERPL-SCNC: 140 MMOL/L
SOURCE AMPLIFICATION: NORMAL
SOURCE ANAL: NORMAL
SOURCE ORAL: NORMAL
SPECIFIC GRAVITY URINE: 1.03
T PALLIDUM AB SER QL IA: NEGATIVE
UROBILINOGEN URINE: 0.2 MG/DL
VIRAL LOAD INTERP: NORMAL
VIRAL LOAD LOG: NORMAL LG COP/ML
WBC # FLD AUTO: 3.59 K/UL

## 2025-02-19 DIAGNOSIS — Z29.9 ENCOUNTER FOR PROPHYLACTIC MEASURES, UNSPECIFIED: ICD-10-CM

## 2025-02-19 DIAGNOSIS — Z11.3 ENCOUNTER FOR SCREENING FOR INFECTIONS WITH A PREDOMINANTLY SEXUAL MODE OF TRANSMISSION: ICD-10-CM

## 2025-04-17 ENCOUNTER — NON-APPOINTMENT (OUTPATIENT)
Age: 45
End: 2025-04-17

## 2025-05-06 ENCOUNTER — APPOINTMENT (OUTPATIENT)
Dept: PEDIATRIC ALLERGY IMMUNOLOGY | Facility: CLINIC | Age: 45
End: 2025-05-06
Payer: COMMERCIAL

## 2025-05-06 DIAGNOSIS — Z29.81 ENCOUNTER FOR HIV PRE-EXPOSURE PROPHYLAXIS: ICD-10-CM

## 2025-05-06 DIAGNOSIS — Z11.3 ENCOUNTER FOR SCREENING FOR INFECTIONS WITH A PREDOMINANTLY SEXUAL MODE OF TRANSMISSION: ICD-10-CM

## 2025-05-06 DIAGNOSIS — Z11.4 ENCOUNTER FOR SCREENING FOR HUMAN IMMUNODEFICIENCY VIRUS [HIV]: ICD-10-CM

## 2025-05-06 PROCEDURE — 99214 OFFICE O/P EST MOD 30 MIN: CPT

## 2025-05-06 PROCEDURE — G2211 COMPLEX E/M VISIT ADD ON: CPT | Mod: NC

## 2025-05-07 LAB
ALBUMIN SERPL ELPH-MCNC: 4.4 G/DL
ALP BLD-CCNC: 61 U/L
ALT SERPL-CCNC: 21 U/L
ANION GAP SERPL CALC-SCNC: 10 MMOL/L
APPEARANCE: CLEAR
AST SERPL-CCNC: 20 U/L
BASOPHILS # BLD AUTO: 0.04 K/UL
BASOPHILS NFR BLD AUTO: 1.1 %
BILIRUB SERPL-MCNC: 0.4 MG/DL
BILIRUBIN URINE: NEGATIVE
BLOOD URINE: NEGATIVE
BUN SERPL-MCNC: 22 MG/DL
C TRACH RRNA SPEC QL NAA+PROBE: NOT DETECTED
C TRACH RRNA SPEC QL NAA+PROBE: NOT DETECTED
CALCIUM SERPL-MCNC: 9.4 MG/DL
CHLORIDE SERPL-SCNC: 104 MMOL/L
CO2 SERPL-SCNC: 26 MMOL/L
COLOR: YELLOW
CREAT SERPL-MCNC: 1.07 MG/DL
EGFRCR SERPLBLD CKD-EPI 2021: 87 ML/MIN/1.73M2
EOSINOPHIL # BLD AUTO: 0.06 K/UL
EOSINOPHIL NFR BLD AUTO: 1.6 %
GLUCOSE QUALITATIVE U: NEGATIVE MG/DL
HCT VFR BLD CALC: 45.7 %
HGB BLD-MCNC: 14.8 G/DL
HIV1 RNA # SERPL NAA+PROBE: NORMAL
HIV1 RNA # SERPL NAA+PROBE: NORMAL COPIES/ML
HIV1+2 AB SPEC QL IA.RAPID: NONREACTIVE
IMM GRANULOCYTES NFR BLD AUTO: 0.3 %
KETONES URINE: NEGATIVE MG/DL
LEUKOCYTE ESTERASE URINE: NEGATIVE
LYMPHOCYTES # BLD AUTO: 1.62 K/UL
LYMPHOCYTES NFR BLD AUTO: 43.3 %
MAN DIFF?: NORMAL
MCHC RBC-ENTMCNC: 31 PG
MCHC RBC-ENTMCNC: 32.4 G/DL
MCV RBC AUTO: 95.6 FL
MONOCYTES # BLD AUTO: 0.33 K/UL
MONOCYTES NFR BLD AUTO: 8.8 %
N GONORRHOEA RRNA SPEC QL NAA+PROBE: NOT DETECTED
N GONORRHOEA RRNA SPEC QL NAA+PROBE: NOT DETECTED
NEUTROPHILS # BLD AUTO: 1.68 K/UL
NEUTROPHILS NFR BLD AUTO: 44.9 %
NITRITE URINE: NEGATIVE
PH URINE: 5.5
PLATELET # BLD AUTO: 268 K/UL
POTASSIUM SERPL-SCNC: 4.6 MMOL/L
PROT SERPL-MCNC: 6.9 G/DL
PROTEIN URINE: NEGATIVE MG/DL
RBC # BLD: 4.78 M/UL
RBC # FLD: 12.6 %
SODIUM SERPL-SCNC: 140 MMOL/L
SOURCE AMPLIFICATION: NORMAL
SOURCE ORAL: NORMAL
SPECIFIC GRAVITY URINE: 1.01
T PALLIDUM AB SER QL IA: NEGATIVE
UROBILINOGEN URINE: 0.2 MG/DL
VIRAL LOAD INTERP: NORMAL
VIRAL LOAD LOG: NORMAL LG COP/ML
WBC # FLD AUTO: 3.74 K/UL

## 2025-05-08 ENCOUNTER — NON-APPOINTMENT (OUTPATIENT)
Age: 45
End: 2025-05-08

## 2025-05-08 LAB
C TRACH RRNA SPEC QL NAA+PROBE: NOT DETECTED
N GONORRHOEA RRNA SPEC QL NAA+PROBE: DETECTED
SOURCE ANAL: NORMAL

## 2025-09-10 LAB
ALBUMIN SERPL ELPH-MCNC: 4.5 G/DL
ALP BLD-CCNC: 67 U/L
ALT SERPL-CCNC: 20 U/L
ANION GAP SERPL CALC-SCNC: 13 MMOL/L
APPEARANCE: CLEAR
AST SERPL-CCNC: 25 U/L
BASOPHILS # BLD AUTO: 0.03 K/UL
BASOPHILS NFR BLD AUTO: 0.7 %
BILIRUB SERPL-MCNC: 0.4 MG/DL
BILIRUBIN URINE: NEGATIVE
BLOOD URINE: NEGATIVE
BUN SERPL-MCNC: 18 MG/DL
CALCIUM SERPL-MCNC: 9.4 MG/DL
CHLORIDE SERPL-SCNC: 104 MMOL/L
CO2 SERPL-SCNC: 26 MMOL/L
COLOR: YELLOW
CREAT SERPL-MCNC: 1.18 MG/DL
EGFRCR SERPLBLD CKD-EPI 2021: 77 ML/MIN/1.73M2
EOSINOPHIL # BLD AUTO: 0.05 K/UL
EOSINOPHIL NFR BLD AUTO: 1.1 %
GLUCOSE QUALITATIVE U: NEGATIVE MG/DL
HCT VFR BLD CALC: 44.9 %
HGB BLD-MCNC: 14.6 G/DL
HIV1 RNA # SERPL NAA+PROBE: NORMAL
HIV1 RNA # SERPL NAA+PROBE: NORMAL COPIES/ML
HIV1+2 AB SPEC QL IA.RAPID: NONREACTIVE
IMM GRANULOCYTES NFR BLD AUTO: 0.2 %
KETONES URINE: NEGATIVE MG/DL
LEUKOCYTE ESTERASE URINE: NEGATIVE
LYMPHOCYTES # BLD AUTO: 1.89 K/UL
LYMPHOCYTES NFR BLD AUTO: 41.7 %
MAN DIFF?: NORMAL
MCHC RBC-ENTMCNC: 30 PG
MCHC RBC-ENTMCNC: 32.5 G/DL
MCV RBC AUTO: 92.2 FL
MONOCYTES # BLD AUTO: 0.29 K/UL
MONOCYTES NFR BLD AUTO: 6.4 %
NEUTROPHILS # BLD AUTO: 2.26 K/UL
NEUTROPHILS NFR BLD AUTO: 49.9 %
NITRITE URINE: NEGATIVE
PH URINE: 5.5
PLATELET # BLD AUTO: 227 K/UL
POTASSIUM SERPL-SCNC: 4.2 MMOL/L
PROT SERPL-MCNC: 7.3 G/DL
PROTEIN URINE: NEGATIVE MG/DL
RBC # BLD: 4.87 M/UL
RBC # FLD: 12.4 %
SODIUM SERPL-SCNC: 142 MMOL/L
SPECIFIC GRAVITY URINE: 1.02
T PALLIDUM AB SER QL IA: NEGATIVE
UROBILINOGEN URINE: 0.2 MG/DL
VIRAL LOAD INTERP: NORMAL
VIRAL LOAD LOG: NORMAL LG COP/ML
WBC # FLD AUTO: 4.53 K/UL

## 2025-09-11 LAB
C TRACH RRNA SPEC QL NAA+PROBE: NOT DETECTED
N GONORRHOEA RRNA SPEC QL NAA+PROBE: NOT DETECTED
SOURCE AMPLIFICATION: NORMAL
SOURCE ANAL: NORMAL
SOURCE ORAL: NORMAL

## (undated) DEVICE — ELCTR BOVIE TIP BLADE INSULATED 2.8" EDGE WITH SAFETY

## (undated) DEVICE — SUT SILK 0 30" SH

## (undated) DEVICE — VESSEL LOOP MAXI-WHITE 0.120" X 16"

## (undated) DEVICE — VENODYNE/SCD SLEEVE CALF MEDIUM

## (undated) DEVICE — GLV 7 PROTEXIS (WHITE)

## (undated) DEVICE — DRAPE 1/2 SHEET 40X57"

## (undated) DEVICE — DRSG TELFA 3 X 8

## (undated) DEVICE — WARMING BLANKET UPPER ADULT

## (undated) DEVICE — PACK COLO RECTAL

## (undated) DEVICE — SYR ASEPTO

## (undated) DEVICE — PREP BETADINE SPONGE STICKS

## (undated) DEVICE — LUBRICATING JELLY ONESHOT 1.25OZ

## (undated) DEVICE — DRAPE TOWEL BLUE 17" X 24"